# Patient Record
Sex: FEMALE | Race: WHITE | NOT HISPANIC OR LATINO | Employment: FULL TIME | URBAN - METROPOLITAN AREA
[De-identification: names, ages, dates, MRNs, and addresses within clinical notes are randomized per-mention and may not be internally consistent; named-entity substitution may affect disease eponyms.]

---

## 2017-01-03 ENCOUNTER — PRENATAL OFFICE VISIT - HEALTHEAST (OUTPATIENT)
Dept: FAMILY MEDICINE | Facility: CLINIC | Age: 37
End: 2017-01-03

## 2017-01-03 DIAGNOSIS — Z34.90 SUPERVISION OF NORMAL PREGNANCY: ICD-10-CM

## 2017-01-20 ENCOUNTER — PRENATAL OFFICE VISIT - HEALTHEAST (OUTPATIENT)
Dept: FAMILY MEDICINE | Facility: CLINIC | Age: 37
End: 2017-01-20

## 2017-01-20 DIAGNOSIS — Z34.90 SUPERVISION OF NORMAL PREGNANCY: ICD-10-CM

## 2017-02-03 ENCOUNTER — PRENATAL OFFICE VISIT - HEALTHEAST (OUTPATIENT)
Dept: FAMILY MEDICINE | Facility: CLINIC | Age: 37
End: 2017-02-03

## 2017-02-03 DIAGNOSIS — Z34.90 SUPERVISION OF NORMAL PREGNANCY: ICD-10-CM

## 2017-02-10 ENCOUNTER — PRENATAL OFFICE VISIT - HEALTHEAST (OUTPATIENT)
Dept: FAMILY MEDICINE | Facility: CLINIC | Age: 37
End: 2017-02-10

## 2017-02-10 DIAGNOSIS — Z34.90 SUPERVISION OF NORMAL PREGNANCY: ICD-10-CM

## 2017-02-17 ENCOUNTER — PRENATAL OFFICE VISIT - HEALTHEAST (OUTPATIENT)
Dept: FAMILY MEDICINE | Facility: CLINIC | Age: 37
End: 2017-02-17

## 2017-02-17 DIAGNOSIS — Z34.90 SUPERVISION OF NORMAL PREGNANCY: ICD-10-CM

## 2017-02-24 ENCOUNTER — PRENATAL OFFICE VISIT - HEALTHEAST (OUTPATIENT)
Dept: FAMILY MEDICINE | Facility: CLINIC | Age: 37
End: 2017-02-24

## 2017-02-24 DIAGNOSIS — Z34.90 SUPERVISION OF NORMAL PREGNANCY: ICD-10-CM

## 2017-02-24 DIAGNOSIS — Z01.818 PREOP EXAMINATION: ICD-10-CM

## 2017-02-24 DIAGNOSIS — Z98.891 PREVIOUS CESAREAN SECTION: ICD-10-CM

## 2017-03-01 ENCOUNTER — ANESTHESIA - HEALTHEAST (OUTPATIENT)
Dept: OBGYN | Facility: CLINIC | Age: 37
End: 2017-03-01

## 2017-03-02 ENCOUNTER — SURGERY - HEALTHEAST (OUTPATIENT)
Dept: OBGYN | Facility: CLINIC | Age: 37
End: 2017-03-02

## 2017-03-02 ASSESSMENT — MIFFLIN-ST. JEOR: SCORE: 1746.37

## 2017-03-07 ENCOUNTER — COMMUNICATION - HEALTHEAST (OUTPATIENT)
Dept: OBGYN | Facility: CLINIC | Age: 37
End: 2017-03-07

## 2017-03-10 ENCOUNTER — COMMUNICATION - HEALTHEAST (OUTPATIENT)
Dept: SCHEDULING | Facility: CLINIC | Age: 37
End: 2017-03-10

## 2017-03-12 ENCOUNTER — COMMUNICATION - HEALTHEAST (OUTPATIENT)
Dept: FAMILY MEDICINE | Facility: CLINIC | Age: 37
End: 2017-03-12

## 2017-03-18 ENCOUNTER — COMMUNICATION - HEALTHEAST (OUTPATIENT)
Dept: FAMILY MEDICINE | Facility: CLINIC | Age: 37
End: 2017-03-18

## 2017-04-25 ENCOUNTER — OFFICE VISIT - HEALTHEAST (OUTPATIENT)
Dept: FAMILY MEDICINE | Facility: CLINIC | Age: 37
End: 2017-04-25

## 2017-04-25 ASSESSMENT — MIFFLIN-ST. JEOR: SCORE: 1628.43

## 2017-04-27 ENCOUNTER — COMMUNICATION - HEALTHEAST (OUTPATIENT)
Dept: HEALTH INFORMATION MANAGEMENT | Facility: CLINIC | Age: 37
End: 2017-04-27

## 2017-04-28 LAB
HPV INTERPRETATION - HISTORICAL: NORMAL
HPV INTERPRETER - HISTORICAL: NORMAL

## 2017-06-20 ENCOUNTER — COMMUNICATION - HEALTHEAST (OUTPATIENT)
Dept: FAMILY MEDICINE | Facility: CLINIC | Age: 37
End: 2017-06-20

## 2017-09-26 ENCOUNTER — COMMUNICATION - HEALTHEAST (OUTPATIENT)
Dept: FAMILY MEDICINE | Facility: CLINIC | Age: 37
End: 2017-09-26

## 2017-11-22 ENCOUNTER — COMMUNICATION - HEALTHEAST (OUTPATIENT)
Dept: FAMILY MEDICINE | Facility: CLINIC | Age: 37
End: 2017-11-22

## 2018-03-09 ENCOUNTER — COMMUNICATION - HEALTHEAST (OUTPATIENT)
Dept: FAMILY MEDICINE | Facility: CLINIC | Age: 38
End: 2018-03-09

## 2018-03-09 DIAGNOSIS — Z80.0 FAMILY HISTORY OF COLON CANCER IN MOTHER: ICD-10-CM

## 2018-03-14 ENCOUNTER — COMMUNICATION - HEALTHEAST (OUTPATIENT)
Dept: FAMILY MEDICINE | Facility: CLINIC | Age: 38
End: 2018-03-14

## 2018-03-21 ENCOUNTER — OFFICE VISIT - HEALTHEAST (OUTPATIENT)
Dept: FAMILY MEDICINE | Facility: CLINIC | Age: 38
End: 2018-03-21

## 2018-03-21 DIAGNOSIS — R63.5 WEIGHT GAIN: ICD-10-CM

## 2018-03-21 LAB
ALBUMIN SERPL-MCNC: 3.9 G/DL (ref 3.5–5)
ALP SERPL-CCNC: 95 U/L (ref 45–120)
ALT SERPL W P-5'-P-CCNC: 19 U/L (ref 0–45)
ANION GAP SERPL CALCULATED.3IONS-SCNC: 7 MMOL/L (ref 5–18)
AST SERPL W P-5'-P-CCNC: 18 U/L (ref 0–40)
BILIRUB SERPL-MCNC: 0.4 MG/DL (ref 0–1)
BUN SERPL-MCNC: 15 MG/DL (ref 8–22)
CALCIUM SERPL-MCNC: 10.1 MG/DL (ref 8.5–10.5)
CHLORIDE BLD-SCNC: 104 MMOL/L (ref 98–107)
CO2 SERPL-SCNC: 26 MMOL/L (ref 22–31)
CREAT SERPL-MCNC: 0.7 MG/DL (ref 0.6–1.1)
ERYTHROCYTE [DISTWIDTH] IN BLOOD BY AUTOMATED COUNT: 11.2 % (ref 11–14.5)
GFR SERPL CREATININE-BSD FRML MDRD: >60 ML/MIN/1.73M2
GLUCOSE BLD-MCNC: 88 MG/DL (ref 70–125)
HCT VFR BLD AUTO: 41 % (ref 35–47)
HGB BLD-MCNC: 14.4 G/DL (ref 12–16)
MCH RBC QN AUTO: 32.4 PG (ref 27–34)
MCHC RBC AUTO-ENTMCNC: 35.1 G/DL (ref 32–36)
MCV RBC AUTO: 92 FL (ref 80–100)
PLATELET # BLD AUTO: 254 THOU/UL (ref 140–440)
PMV BLD AUTO: 8.5 FL (ref 7–10)
POTASSIUM BLD-SCNC: 4.3 MMOL/L (ref 3.5–5)
PROT SERPL-MCNC: 7.4 G/DL (ref 6–8)
RBC # BLD AUTO: 4.44 MILL/UL (ref 3.8–5.4)
SODIUM SERPL-SCNC: 137 MMOL/L (ref 136–145)
TSH SERPL DL<=0.005 MIU/L-ACNC: 1.47 UIU/ML (ref 0.3–5)
WBC: 7.1 THOU/UL (ref 4–11)

## 2018-03-21 ASSESSMENT — MIFFLIN-ST. JEOR: SCORE: 1651.11

## 2018-03-27 ENCOUNTER — COMMUNICATION - HEALTHEAST (OUTPATIENT)
Dept: FAMILY MEDICINE | Facility: CLINIC | Age: 38
End: 2018-03-27

## 2018-03-29 ENCOUNTER — OFFICE VISIT - HEALTHEAST (OUTPATIENT)
Dept: FAMILY MEDICINE | Facility: CLINIC | Age: 38
End: 2018-03-29

## 2018-03-29 DIAGNOSIS — E66.9 OBESITY (BMI 35.0-39.9 WITHOUT COMORBIDITY): ICD-10-CM

## 2018-03-29 ASSESSMENT — MIFFLIN-ST. JEOR: SCORE: 1646.58

## 2018-06-26 ENCOUNTER — COMMUNICATION - HEALTHEAST (OUTPATIENT)
Dept: FAMILY MEDICINE | Facility: CLINIC | Age: 38
End: 2018-06-26

## 2018-06-26 DIAGNOSIS — E66.9 OBESITY (BMI 35.0-39.9 WITHOUT COMORBIDITY): ICD-10-CM

## 2018-07-02 ENCOUNTER — COMMUNICATION - HEALTHEAST (OUTPATIENT)
Dept: FAMILY MEDICINE | Facility: CLINIC | Age: 38
End: 2018-07-02

## 2018-07-02 DIAGNOSIS — E66.9 OBESITY (BMI 35.0-39.9 WITHOUT COMORBIDITY): ICD-10-CM

## 2018-07-11 ENCOUNTER — OFFICE VISIT - HEALTHEAST (OUTPATIENT)
Dept: FAMILY MEDICINE | Facility: CLINIC | Age: 38
End: 2018-07-11

## 2018-07-11 DIAGNOSIS — E66.9 OBESITY (BMI 35.0-39.9 WITHOUT COMORBIDITY): ICD-10-CM

## 2018-07-11 ASSESSMENT — MIFFLIN-ST. JEOR: SCORE: 1569.47

## 2018-07-12 ENCOUNTER — COMMUNICATION - HEALTHEAST (OUTPATIENT)
Dept: FAMILY MEDICINE | Facility: CLINIC | Age: 38
End: 2018-07-12

## 2018-09-25 ENCOUNTER — COMMUNICATION - HEALTHEAST (OUTPATIENT)
Dept: FAMILY MEDICINE | Facility: CLINIC | Age: 38
End: 2018-09-25

## 2018-10-15 ENCOUNTER — COMMUNICATION - HEALTHEAST (OUTPATIENT)
Dept: FAMILY MEDICINE | Facility: CLINIC | Age: 38
End: 2018-10-15

## 2018-11-08 ENCOUNTER — COMMUNICATION - HEALTHEAST (OUTPATIENT)
Dept: FAMILY MEDICINE | Facility: CLINIC | Age: 38
End: 2018-11-08

## 2018-11-09 ENCOUNTER — AMBULATORY - HEALTHEAST (OUTPATIENT)
Dept: FAMILY MEDICINE | Facility: CLINIC | Age: 38
End: 2018-11-09

## 2018-11-09 DIAGNOSIS — E66.811 OBESITY (BMI 30.0-34.9): ICD-10-CM

## 2018-12-21 ENCOUNTER — RECORDS - HEALTHEAST (OUTPATIENT)
Dept: ADMINISTRATIVE | Facility: OTHER | Age: 38
End: 2018-12-21

## 2019-02-13 ENCOUNTER — OFFICE VISIT - HEALTHEAST (OUTPATIENT)
Dept: FAMILY MEDICINE | Facility: CLINIC | Age: 39
End: 2019-02-13

## 2019-02-13 DIAGNOSIS — E66.811 OBESITY (BMI 30.0-34.9): ICD-10-CM

## 2019-08-01 ENCOUNTER — AMBULATORY - HEALTHEAST (OUTPATIENT)
Dept: FAMILY MEDICINE | Facility: CLINIC | Age: 39
End: 2019-08-01

## 2019-08-01 ENCOUNTER — COMMUNICATION - HEALTHEAST (OUTPATIENT)
Dept: FAMILY MEDICINE | Facility: CLINIC | Age: 39
End: 2019-08-01

## 2019-08-01 DIAGNOSIS — E66.811 OBESITY (BMI 30.0-34.9): ICD-10-CM

## 2019-08-12 ENCOUNTER — OFFICE VISIT - HEALTHEAST (OUTPATIENT)
Dept: FAMILY MEDICINE | Facility: CLINIC | Age: 39
End: 2019-08-12

## 2019-08-12 DIAGNOSIS — E66.01 MORBID OBESITY (H): ICD-10-CM

## 2019-08-12 ASSESSMENT — MIFFLIN-ST. JEOR: SCORE: 1623.9

## 2019-09-05 ENCOUNTER — COMMUNICATION - HEALTHEAST (OUTPATIENT)
Dept: FAMILY MEDICINE | Facility: CLINIC | Age: 39
End: 2019-09-05

## 2019-11-27 ENCOUNTER — COMMUNICATION - HEALTHEAST (OUTPATIENT)
Dept: FAMILY MEDICINE | Facility: CLINIC | Age: 39
End: 2019-11-27

## 2019-11-27 DIAGNOSIS — B00.1 COLD SORE: ICD-10-CM

## 2020-04-06 ENCOUNTER — COMMUNICATION - HEALTHEAST (OUTPATIENT)
Dept: FAMILY MEDICINE | Facility: CLINIC | Age: 40
End: 2020-04-06

## 2020-04-06 DIAGNOSIS — E66.811 OBESITY (BMI 30.0-34.9): ICD-10-CM

## 2020-06-09 ENCOUNTER — COMMUNICATION - HEALTHEAST (OUTPATIENT)
Dept: FAMILY MEDICINE | Facility: CLINIC | Age: 40
End: 2020-06-09

## 2020-06-09 ENCOUNTER — OFFICE VISIT - HEALTHEAST (OUTPATIENT)
Dept: FAMILY MEDICINE | Facility: CLINIC | Age: 40
End: 2020-06-09

## 2020-06-09 ENCOUNTER — COMMUNICATION - HEALTHEAST (OUTPATIENT)
Dept: SCHEDULING | Facility: CLINIC | Age: 40
End: 2020-06-09

## 2020-06-09 DIAGNOSIS — E66.811 OBESITY (BMI 30.0-34.9): ICD-10-CM

## 2020-12-14 ENCOUNTER — COMMUNICATION - HEALTHEAST (OUTPATIENT)
Dept: FAMILY MEDICINE | Facility: CLINIC | Age: 40
End: 2020-12-14

## 2020-12-14 DIAGNOSIS — E66.811 OBESITY (BMI 30.0-34.9): ICD-10-CM

## 2021-01-06 ENCOUNTER — OFFICE VISIT - HEALTHEAST (OUTPATIENT)
Dept: FAMILY MEDICINE | Facility: CLINIC | Age: 41
End: 2021-01-06

## 2021-01-06 DIAGNOSIS — E66.811 OBESITY (BMI 30.0-34.9): ICD-10-CM

## 2021-01-06 RX ORDER — VALACYCLOVIR HYDROCHLORIDE 1 G/1
TABLET, FILM COATED ORAL
Status: SHIPPED | COMMUNITY
Start: 2020-02-07 | End: 2022-02-28

## 2021-05-30 VITALS — BODY MASS INDEX: 39.27 KG/M2 | WEIGHT: 236 LBS

## 2021-05-30 VITALS — BODY MASS INDEX: 38.97 KG/M2 | WEIGHT: 234.2 LBS

## 2021-05-30 VITALS — BODY MASS INDEX: 34.99 KG/M2 | HEIGHT: 65 IN | WEIGHT: 210 LBS

## 2021-05-30 VITALS — WEIGHT: 233.6 LBS | BODY MASS INDEX: 38.87 KG/M2

## 2021-05-30 VITALS — BODY MASS INDEX: 39.51 KG/M2 | WEIGHT: 237.4 LBS

## 2021-05-30 VITALS — WEIGHT: 230.6 LBS | BODY MASS INDEX: 38.37 KG/M2

## 2021-05-30 VITALS — WEIGHT: 235 LBS | BODY MASS INDEX: 39.11 KG/M2

## 2021-05-30 VITALS — BODY MASS INDEX: 39.32 KG/M2 | HEIGHT: 65 IN | WEIGHT: 236 LBS

## 2021-05-31 NOTE — PROGRESS NOTES
PROGRESS NOTE       SUBJECTIVE:  Caryl Johnson is a 39 y.o. female   Chief Complaint   Patient presents with     Medication Refill     med check and refill      Caryl is here today to talk about refills for phentermine.  She is struggled with her weight and has been unsuccessful in losing.  She finds the medication certainly does help decrease her appetite but she has failed at losing weight.  Her body mass index is just under 35.  When I first met her in March 2018 she weighed 215.  On phentermine she was successful in losing weight and weighed 197 July 2018.  Unfortunately she gained almost all of it back.  We talked about how it is hard on her body to losing gain.  We need to have an overall plan to lose and keep her weight off.    Patient Active Problem List   Diagnosis     Common Migraine (Without Aura)     Irregular Length Of Menstrual Periods     Obesity (BMI 30.0-34.9)       Current Outpatient Medications   Medication Sig Dispense Refill     phentermine (ADIPEX-P) 37.5 mg tablet Take 1 tablet (37.5 mg total) by mouth daily before breakfast. 30 tablet 2     valACYclovir (VALTREX) 500 MG tablet Take 1 tablet (500 mg total) by mouth 2 (two) times a day. For 3 days at first sign of out break 6 tablet 2     No current facility-administered medications for this visit.        Social History     Tobacco Use   Smoking Status Never Smoker   Smokeless Tobacco Never Used       REVIEW OF SYSTEMS:  Patient denies fever, chills, dizziness, headache, visual change, ear pain, cough, chest pain, shortness of breath, abdominal pain, extremity pain or swelling, rash,  depression or anxiety.          OBJECTIVE:       Vitals:    08/12/19 0807   BP: 118/78   Pulse: 98   SpO2: 100%     Weight: 209 lb (94.8 kg)    Wt Readings from Last 3 Encounters:   08/12/19 209 lb (94.8 kg)   02/13/19 196 lb 14.4 oz (89.3 kg)   07/11/18 197 lb (89.4 kg)     Body mass index is 34.78 kg/m .        Physical Exam:  GENERAL APPEARANCE: A&A,  NAD, well hydrated, well nourished  SKIN:  Normal skin turgor, no lesions/rashes   NECK: Supple, without lymphadenopathy, no thyroid mass  CV: RRR, no M/G/R   LUNGS: CTAB, normal respiratory effort  PSYCHIATRIC:  Mood appropriate, memory intact        ASSESSMENT/PLAN:     1. Morbid obesity (H)  Patient is very interested in pursuing other options for weight loss.  After discussion of what is available we decided to elect a nonsurgical weight loss clinic.  I think she would be a great candidate because she has demonstrated the ability to lose weight but needs to figure out how to do it in a way that the weight stays off and she is healthy.  No prescriptions were given.  - Ambulatory referral to Bariatric Care: Surgical and Non-Surgical      I spent a total of 20 minutes face to face with the patient.  Over 50% of the time spent counseling and educating the patient about all of the above.      Consuelo Larkin MD

## 2021-06-01 ENCOUNTER — RECORDS - HEALTHEAST (OUTPATIENT)
Dept: FAMILY MEDICINE | Facility: CLINIC | Age: 41
End: 2021-06-01

## 2021-06-01 VITALS — BODY MASS INDEX: 35.65 KG/M2 | HEIGHT: 65 IN | WEIGHT: 214 LBS

## 2021-06-01 VITALS — WEIGHT: 197 LBS | HEIGHT: 65 IN | BODY MASS INDEX: 32.82 KG/M2

## 2021-06-01 VITALS — HEIGHT: 65 IN | WEIGHT: 215 LBS | BODY MASS INDEX: 35.82 KG/M2

## 2021-06-02 VITALS — WEIGHT: 196.9 LBS | BODY MASS INDEX: 32.77 KG/M2

## 2021-06-03 VITALS — HEIGHT: 65 IN | BODY MASS INDEX: 34.82 KG/M2 | WEIGHT: 209 LBS

## 2021-06-04 VITALS — DIASTOLIC BLOOD PRESSURE: 82 MMHG | BODY MASS INDEX: 36.44 KG/M2 | SYSTOLIC BLOOD PRESSURE: 118 MMHG | WEIGHT: 219 LBS

## 2021-06-07 ENCOUNTER — OFFICE VISIT - HEALTHEAST (OUTPATIENT)
Dept: FAMILY MEDICINE | Facility: CLINIC | Age: 41
End: 2021-06-07

## 2021-06-07 DIAGNOSIS — E66.811 OBESITY (BMI 30.0-34.9): ICD-10-CM

## 2021-06-07 RX ORDER — TOPIRAMATE 25 MG/1
25 TABLET, FILM COATED ORAL DAILY
Qty: 30 TABLET | Refills: 3 | Status: SHIPPED | OUTPATIENT
Start: 2021-06-07 | End: 2022-02-08

## 2021-06-07 RX ORDER — PHENTERMINE HYDROCHLORIDE 37.5 MG/1
37.5 TABLET ORAL
Qty: 30 TABLET | Refills: 3 | Status: SHIPPED | OUTPATIENT
Start: 2021-06-07 | End: 2022-02-08

## 2021-06-07 NOTE — TELEPHONE ENCOUNTER
Controlled Substance Refill Request  Medication Name:   Requested Prescriptions     Pending Prescriptions Disp Refills     phentermine (ADIPEX-P) 37.5 mg tablet 30 tablet 2     Sig: Take 1 tablet (37.5 mg total) by mouth daily before breakfast.     Date Last Fill: 8/1/2019  Requested Pharmacy: [facility]  Submit electronically to pharmacy  Controlled Substance Agreement on file:           Last office visit:  8/12/2019  Discussed going to the weight loss clinic. Pt notified to schedule phone visit to discuss refill.     Will send to PCP for review of refill request.

## 2021-06-08 NOTE — PROGRESS NOTES
"Cayrl Johnson is a 40 y.o. female who is being evaluated via a billable video visit.      The patient has been notified of following:     \"This video visit will be conducted via a call between you and your physician/provider. We have found that certain health care needs can be provided without the need for an in-person physical exam.  This service lets us provide the care you need with a video conversation.  If a prescription is necessary we can send it directly to your pharmacy.  If lab work is needed we can place an order for that and you can then stop by our lab to have the test done at a later time.    Video visits are billed at different rates depending on your insurance coverage. Please reach out to your insurance provider with any questions.    If during the course of the call the physician/provider feels a video visit is not appropriate, you will not be charged for this service.\"    Patient has given verbal consent to a Video visit? Yes    Patient would like to receive their AVS by AVS Preference: Karl.    Patient would like the video invitation sent by: Text to cell phone: [phone]    Will anyone else be joining your video visit? No        Video Start Time: 11:58 AM    Additional provider notes:    ASSESSMENT/PLAN:       1. Obesity (BMI 30.0-34.9)  -Has done well historically with phentermine. Will restart this at the dose listed below, will follow up in 3 months for a recheck.  - phentermine (ADIPEX-P) 37.5 mg tablet; Take 1 tablet (37.5 mg total) by mouth daily before breakfast.  Dispense: 30 tablet; Refill: 2        PROGRESS NOTE   6/9/2020    SUBJECTIVE:  Caryl Johnson is a 40 y.o. female  who presents for medication check on her phentermine.     She was started back on phentermine in April. She is struggling with COVID, is working 60-80 hours per week and not having a life. She is at [facility] now, she is with Dr. Jones.     She is up a fair amount with her weight. She is doing ok with " food generally, breakfast granola bar, lunches salad or sandwhich. Does snack during the day as well. At night it depends on what time she gets home and after school activites. She does try to make meals. She does some meal planning. SHe has been working on the weekends as well, so this has been harder. She is in the process of splitting up with her . She is managing ok with this. She has not checked her blood pressure at home.   Chief Complaint   Patient presents with     Medication Management     Follow up on phentermine prescription. Needing a refill of phentermine.          Patient Active Problem List   Diagnosis     Common Migraine (Without Aura)     Irregular Length Of Menstrual Periods     Obesity (BMI 30.0-34.9)       Current Outpatient Medications   Medication Sig Dispense Refill     omeprazole (PRILOSEC) 20 MG capsule Take 20 mg by mouth daily before breakfast.       phentermine (ADIPEX-P) 37.5 mg tablet Take 1 tablet (37.5 mg total) by mouth daily before breakfast. 30 tablet 2     No current facility-administered medications for this visit.        Social History     Tobacco Use   Smoking Status Never Smoker   Smokeless Tobacco Never Used           OBJECTIVE:        No results found for this or any previous visit (from the past 240 hour(s)).    Vitals:    06/09/20 1135   BP: 118/82   Weight: 219 lb (99.3 kg)     Weight: 219 lb (99.3 kg) (Pt reported)       GENERAL: Healthy, alert and no distress  EYES: Eyes grossly normal to inspection. No discharge or erythema, or obvious scleral/conjunctival abnormalities.  RESP: No audible wheeze, cough, or visible cyanosis.  No visible retractions or increased work of breathing.    NEURO: Cranial nerves grossly intact. Mentation and speech appropriate for age.  PSYCH: Mentation appears normal, affect normal/bright, judgement and insight intact, normal speech and appearance well-groomed      Video-Visit Details    Type of service:  Video Visit    Video End Time  (time video stopped): 12:08 PM  Originating Location (pt. Location): Work    Distant Location (provider location):  [facility]/OB     Platform used for Video Visit: Preston Garcia MD

## 2021-06-08 NOTE — PROGRESS NOTES
Caryl Johnson is a 37 y.o. female who is presenting to the clinic today for a routine OB visit. She reports that she is doing well overall. She continues feeling lots of baby movement. She denies any bleeding, but is having occasional contractions, like she has been having.  She is not having anything regular and they are not painful for her.  She reports that her insomnia has been increasing, but she notes that she is able to function normally during the day. She is having trouble getting comfortable during the night and often notices mind racing. She mentions that her grandmother has restless leg syndrome, and she is wondering if she may be experiencing this as well.  At this point she will continue to monitor symptoms.  She is not interested in getting on a medication to help her sleep at this point.  We did discuss that indeed if she does have restless leg syndrome we could discuss this once she is no longer pregnant and no longer breast-feeding but at this point the medications that we could potentially use for that are not appropriate with pregnancy or breast-feeding.  She plans to breast feed and has a breast pump at home. She plans to have a circumcision done if she has a boy. She had her group B strep test done at her last visit and this returned negative. She is going to the hospital after this appointment to visit her friend who recently gave birth.  Cervix closed and thick.  She will complete preregistration form.  All of her questions have been answered today. She will plan to in one week for her next OB visit.  She continues to have a planned  on 3/2/17.    ADDITIONAL HISTORY SUMMARIZED (2): None.  DECISION TO OBTAIN EXTRA INFORMATION (1): None.   RADIOLOGY TESTS (1): None.  LABS (1): None.  MEDICINE TESTS (1): None.  INDEPENDENT REVIEW (2 each): None.     The visit lasted a total of 11 minutes face to face with the patient. Over 50% of the time was spent counseling and educating the  patient about routine OB visit.    I, Ivonne Chowdhury, am scribing for and in the presence of, Dr. Bronson.    I, Dr. Bronson, personally performed the services described in this documentation, as scribed by Ivonne Chowdhury in my presence, and it is both accurate and complete.    Total data points: 0

## 2021-06-08 NOTE — PROGRESS NOTES
Caryl Johnson is a 37 y.o. female who is presenting to the clinic today for a routine ob visit. She reports that she is doing well overall. She continues to feel lots of baby movement and denies any bleeding. She reports 2-3 contractions an hour for a few hours a couple days ago. She has had contractions here and there since, but nothing has been consistent. She has a breast pump already and has done the  Preregistration. She is still planning repeat csection on 3/2/17.  All questions have been answered today. She will return in one week for her next OB appointment.        ADDITIONAL HISTORY SUMMARIZED (2): None.  DECISION TO OBTAIN EXTRA INFORMATION (1): None.   RADIOLOGY TESTS (1): None.  LABS (1): None.  MEDICINE TESTS (1): None.  INDEPENDENT REVIEW (2 each): None.     The visit lasted a total of 5 minutes face to face with the patient. Over 50% of the time was spent counseling and educating the patient about routine OB visit.    I, Ivonne Chowdhury, am scribing for and in the presence of, Dr. Bronson.    I, Dr. Bronson, personally performed the services described in this documentation, as scribed by Ivonne Chowdhury in my presence, and it is both accurate and complete.    Total data points: 0

## 2021-06-08 NOTE — PROGRESS NOTES
Caryl Johnson is a 37 y.o. female who is presenting to the clinic today for a routine prenatal visit. She reports she is doing well overall. She is feeling a lot of baby movement. She denies that she is having any bleeding or contractions. She is planning to have repeat  on 3/2/17. She has already had her Tdap this pregnancy as well as an influenza vaccine.  She also had repeat syphilis testing done in December.  She overall feels like things are doing well.  Her friend had a fetal loss at 36 weeks gestation and therefore she is overly vigilant in terms of making sure that she is monitoring to make sure her baby is continuing to move.  We discussed that this is not uncommon and very reasonable for her to be overly concerned about anything happening to her child at this time.  She delivered at [facility] previously and therefore does not feel like she needs to have a tumor of the hospital nor does she feel like she needs to take any classes.  She was reminded to do preregistration at 36 weeks gestation.  All of her questions were answered today. She will return in 2 weeks for her next OB visit.  We will plan to do group B strep testing as well as hemoglobin at that visit.      ADDITIONAL HISTORY SUMMARIZED (2): None.  DECISION TO OBTAIN EXTRA INFORMATION (1): None.   RADIOLOGY TESTS (1): None.  LABS (1): None.  MEDICINE TESTS (1): None.  INDEPENDENT REVIEW (2 each): None.     The visit lasted a total of 14  minutes face to face with the patient. Over 50% of the time was spent counseling and educating the patient about routine OB visit.    I, Ivonne Chowdhury, am scribing for and in the presence of, Dr. Bronson.    I, Dr. Bronson, personally performed the services described in this documentation, as scribed by Ivonne Chowdhury in my presence, and it is both accurate and complete.    Total data points: 0

## 2021-06-09 NOTE — ANESTHESIA CARE TRANSFER NOTE
Last vitals:   Vitals:    03/02/17 0832   BP: 106/53   Pulse: 80   Resp: 16   Temp: 36.4  C (97.6  F)   SpO2: 100%     Patient's level of consciousness is awake  Spontaneous respirations: yes  Maintains airway independently: yes  Dentition unchanged: yes  Oropharynx: oropharynx clear of all foreign objects    QCDR Measures:  ASA# 20 - Surgical Safety Checklist: ASA20A - Safety Checks Done  PQRS# 430 - Adult PONV Prevention: 4558F-8P - Pt did NOT receive => 2 anti-emetic agents  ASA# 8 - Peds PONV Prevention: NA - Not pediatric patient, not GA or 2 or more risk factors NOT present  PQRS# 424 - Cristela-op Temp Management: 4559F - At least one body temp DOCUMENTED => 35.5C or 95.9F within required timeframe  PQRS# 426 - PACU Transfer Protocol: - Transfer of care checklist used  ASA# 14 - Acute Post-op Pain: ASA14B - Patient did NOT experience pain >= 7 out of 10    I completed my SBAR handoff to the receiving nurse per policy and procedure.

## 2021-06-09 NOTE — ANESTHESIA POSTPROCEDURE EVALUATION
Patient: Caryl Johnson  REPEAT  SECTION   Anesthesia type: spinal    Patient location: Labor and Delivery  Last vitals:   Vitals:    17 0902   BP: 108/66   Pulse: 87   Resp: 16   Temp:    SpO2:      Post vital signs: stable  Level of consciousness: awake, alert and oriented  Post-anesthesia pain: pain controlled  Post-anesthesia nausea and vomiting: no  Pulmonary: unassisted, return to baseline  Cardiovascular: stable and blood pressure at baseline  Hydration: adequate  Anesthetic events: no    QCDR Measures:  ASA# 11 - Cristela-op Cardiac Arrest: ASA11B - Patient did NOT experience unanticipated cardiac arrest  ASA# 12 - Cristela-op Mortality Rate: ASA12B - Patient did NOT die  ASA# 13 - PACU Re-Intubation Rate: NA - No ETT / LMA used for case  ASA# 10 - Composite Anes Safety: ASA10A - No serious adverse event  ASA# 38 - New Corneal Injury: ASA38A - No new exposure keratitis or corneal abrasion in PACU    Additional Notes:

## 2021-06-09 NOTE — ANESTHESIA PREPROCEDURE EVALUATION
Anesthesia Evaluation      Patient summary reviewed   No history of anesthetic complications     Airway   Mallampati: I   Pulmonary - normal exam                          Cardiovascular - negative ROS  Rhythm: regular  Rate: normal,         Neuro/Psych - negative ROS     Endo/Other    (+) pregnant     GI/Hepatic/Renal            Dental                         Anesthesia Plan  Planned anesthetic: spinal    ASA 2     Anesthetic plan and risks discussed with: patient    Post-op plan: routine recovery

## 2021-06-09 NOTE — ANESTHESIA PROCEDURE NOTES
Spinal Block    Patient location during procedure: OR  Start time: 3/2/2017 7:47 AM  End time: 3/2/2017 7:48 AM  Reason for block: primary anesthetic    Staffing:  Performing  Anesthesiologist: KARLA VENEGAS    Preanesthetic Checklist  Completed: patient identified, risks, benefits, and alternatives discussed, timeout performed, consent obtained, airway assessed, oxygen available, suction available, emergency drugs available and hand hygiene performed  Spinal Block  Patient position: sitting  Prep: ChloraPrep  Patient monitoring: heart rate, cardiac monitor, continuous pulse ox and blood pressure  Approach: midline  Location: L3-4  Injection technique: single-shot  Needle type: pencil-tip   Needle gauge: 24 G      Additional Notes:  Tolerated well

## 2021-06-09 NOTE — PROGRESS NOTES
Assessment/Plan:     Assessment: Caryl Johnson is a 37 y.o. female who presents for pre-op history and physical. She is scheduled to undergo a repeat  on 3/2/17.     Plan:    She appears to be medically optimized for the planned procedure.   . Labs were done as indicated below.  Recommendations were reviewed with the patient.     The patient is approved for surgery and is considered to be low anesthetic risk.   Follow up as needed.    Please page me at [phone] if you have any questions or concerns regarding the care of this patient.     Subjective:     Scheduled Procedure: Repeat   Surgery Date:  3/2/17  Surgery Location:  [facility]   Surgeon:  Dr. Pleitez    Current Outpatient Prescriptions   Medication Sig Dispense Refill     prenatal vitamin iron-folic acid 27mg-0.8mg (PRENATAL S) 27 mg iron- 800 mcg Tab tablet Take 1 tablet by mouth daily.       No current facility-administered medications for this visit.        No Known Allergies    Immunization History   Administered Date(s) Administered     Influenza, inj, historic 2014     Influenza,seasonal quad, PF, 36+MOS 10/14/2016     Tdap 2015, 2016       Patient Active Problem List   Diagnosis     Common Migraine (Without Aura)     Irregular Length Of Menstrual Periods     Supervision of normal pregnancy       Past Medical History:   Diagnosis Date     Elevated blood pressure complicating pregnancy, antepartum 2015     Herpes genitalia     on valtrex when needed for outbreaks     Infertility, anovulation     used clomid to concieve first 3 pregnancies, first 2 were miscarriages     Leg swelling in pregnancy 2015     Proteinuria complicating pregnancy 2015     Single delivery by  section 2015     Unspecified spontaneous  without mention of complication     x2, 2014, 2014       Social History     Social History     Marital status:      Spouse name: Regino     Number of children: 1      Years of education: N/A     Occupational History     homemaker      Social History Main Topics     Smoking status: Never Smoker     Smokeless tobacco: Never Used     Alcohol use No     Drug use: No     Sexual activity: Yes     Partners: Male      Comment: pregnancy     Other Topics Concern     Not on file     Social History Narrative       Past Surgical History:   Procedure Laterality Date      SECTION  2015    failure to progress beyond 8 cm, elevated blood pressures after delivery     INGUINAL HERNIA REPAIR Left     3rd grade, used mesh       Family History   Problem Relation Age of Onset     Colon cancer Mother      Colorectal cancer,       Hyperlipidemia Father      Hypertension Father      Kidney disease Father      Breast cancer Paternal Grandmother      Diagnosed age 60s     Uterine cancer Paternal Grandmother      Diagnosed age 70s     Heart disease Paternal Grandfather      MI, CABG ×4     Hypertension Paternal Grandfather      Kidney cancer Maternal Uncle      Prostate cancer Maternal Uncle      Liver cancer Paternal Uncle          HPI: Caryl Johnson is a 37 y.o. female who is presenting to the clinic today for a preoperative exam. She is scheduled to undergo a  on 3/2/17 at [facility] with Dr. Pleitez. She had a  in 2015 after failure to progress during labor. There were no complications. She reports that she is doing well overall. She continues to feel lots of baby movement and denies any bleeding. She continues to have contractions intermittently, unchanged from last week.     History of Present Illness  Recent Health  Fever: no  Chills: no  Fatigue: no  Chest Pain: no  Cough: no  Dyspnea: no  Urinary Frequency: no  Nausea: no  Vomiting: no  Diarrhea: no  Abdominal Pain: no  Easy Bruising: no  Lower Extremity Swelling: no  Poor Exercise Tolerance: no    Pertinent History  Prior Anesthesia: yes  Previous Anesthesia Reaction:  no  Diabetes:  no  Cardiovascular Disease: no  Pulmonary Disease: no  Renal Disease: no  GI Disease: no  Sleep Apnea: no  Thromboembolic Problems: no  Clotting Disorder: no  Bleeding Disorder: no  Transfusion Reaction: no  Impaired Immunity: no  Steroid use in the last 6 months: no  Frequent Aspirin use: no    No family history of anesthesia reaction, seizure, aneurysm, sudden death, clotting disorder or bleeding disorder.     After surgery, the patient plans to recover at home with family.    Review of Systems:  Denies fever, chills, visual changes, fatigue, myalgias, nasal congestion, rhinorrhea, ear pain or discharge, sore throat, swollen glands, breast mass, nipple discharge, breast changes, abdominal pain, nausea, vomiting, diarrhea, constipation, cough, shortness of breath, chest pain, weight change, change in bowel habits, melena, rectal bleeding, dysuria, frequency, urgency, hematuria, polyuria, polydipsia, polyphagia, joint pain or swelling or erythema, edema, rash, weakness, paresthesias, vaginal discharge or bleeding or mood changes.  Remainder of review of systems was negative.    Positives: Some intermittent contractions. Episodes of light-headedness yesterday, better today.       Objective:     Visit Vitals     /66     Wt (!) 236 lb (107 kg)     LMP 05/31/2016     BMI 39.27 kg/m2     Weight: 236 lb (107 kg)      Physical Exam:  General Appearance: Alert, cooperative, no distress, appears stated age  Head: Normocephalic, without obvious abnormality, atraumatic  Eyes: PERRL, conjunctiva/corneas clear, EOM's intact  Ears: Normal TM's and external ear canals, both ears  Nose: Nares normal, septum midline,mucosa normal, no drainage  Throat: Lips, mucosa, and tongue normal; teeth and gums normal  Neck: Supple, symmetrical, trachea midline, no adenopathy;  thyroid: not enlarged, symmetric, no tenderness/mass/nodules  Back: Symmetric, no curvature, ROM normal, no CVA tenderness  Lungs: Clear to auscultation  bilaterally, respirations unlabored  Heart: Regular rate and rhythm, S1 and S2 normal, no murmur, rub, or gallop   Abdomen: Soft, non-tender, bowel sounds active all four quadrants,  no masses, no organomegaly. Gravid uterus, appropriate for 39 weeks gestation.   Extremities: Extremities normal, atraumatic, no cyanosis or edema  Skin: Skin color, texture, turgor normal, no rashes or lesions  Lymph nodes: Cervical, supraclavicular nodes normal  Neurologic: Normal         Maryanne Bronson M.D.  [id number]  [place] [address]  Ph: [phone] Fax: [phone]  Pager [phone]      ADDITIONAL HISTORY SUMMARIZED (2): None.  DECISION TO OBTAIN EXTRA INFORMATION (1): None.   RADIOLOGY TESTS (1): None.  LABS (1): None.  MEDICINE TESTS (1): None.  INDEPENDENT REVIEW (2 each): None.     The visit lasted a total of 16 minutes face to face with the patient. Over 50% of the time was spent counseling and educating the patient about routine OB visit and preoperative exam.    I, Ivonne Chowdhury, am scribing for and in the presence of, Dr. Bronson.    I, Dr. Bronson, personally performed the services described in this documentation, as scribed by Ivonne Chowdhury in my presence, and it is both accurate and complete.    Total data points: 0

## 2021-06-09 NOTE — PROGRESS NOTES
She reports that she is doing well overall. She continues to feel lots of baby movement and denies any bleeding. She continues to have contractions intermittently, unchanged from last week. Yesterday when standing after sitting, she experienced some light-headedness on three separate occasions. She has not had an episode since, and is trying to drink more water. She denies swelling in her legs. She denies stomach pains. She has had all of her lab work and vaccinations.  She will continue to monitor the lightheadedness but I suspect it may be because she is not drinking enough water we discussed this at length today.  Preop exam was performed today.  Please see separate dictation.  All of her questions have been answered today.  She will present to labor and delivery next Thursday 3/217 for scheduled .  If she has additional questions or concerns prior to that will let me know.

## 2021-06-13 NOTE — TELEPHONE ENCOUNTER
Last visit 6/9/20  Last rx 6/9/20 #30 RF 2.   Upcoming appt 1/6/21    Pt requesting refill of Phentermine.

## 2021-06-14 NOTE — PROGRESS NOTES
Caryl Johnson is a 41 y.o. female who is being evaluated via a billable video visit.      How would you like to obtain your AVS? MyChart.  If dropped from the video visit, the video invitation should be resent by: Text to cell phone: [phone]   Will anyone else be joining your video visit? No      Video Start Time: 12:07 PM  Assessment & Plan     Obesity (BMI 30.0-34.9)  -stable weight at this time. As her loss has slowed will trial addition of topamax on with her phentermine. If doing well will f/u in 3 months. If not doing well will f/u with me sooner via mychart and we can adjust dosage of topamax (with decrease in phentermine) or change of medication all together.   - topiramate (TOPAMAX) 25 MG tablet  Dispense: 30 tablet; Refill: 2  - phentermine (ADIPEX-P) 37.5 mg tablet  Dispense: 30 tablet; Refill: 2               Return in about 3 months (around 4/6/2021) for recheck.    Caryl Garcia MD  [facility]     Caryl Johnson is 41 y.o. and presents to clinic today for the following health issues   HPI     She is following up today for a medication check. She has been on phentermine and is due for a medication check. She is packing up her house right now, they are closing on her house on Friday. This is new from last time.     She is wondering about if she has hit a plateau with the phentermine.     She does have some stress/anxiety with thechanges. She has never been on medications for this. She has been doing breathing exercises, clearing her mind. This is working well for her right now.       Review of Systems      Objective    Vitals - Patient Reported  Systolic (Patient Reported): 124  Diastolic (Patient Reported): 78  Weight (Patient Reported): 205 lb (93 kg)    Physical Exam  GENERAL: Healthy, alert and no distress  EYES: Eyes grossly normal to inspection. No discharge or erythema, or obvious scleral/conjunctival abnormalities.  RESP: No  audible wheeze, cough, or visible cyanosis.  No visible retractions or increased work of breathing.    NEURO: Cranial nerves grossly intact. Mentation and speech appropriate for age.  PSYCH: Mentation appears normal, affect normal/bright, judgement and insight intact, normal speech and appearance well-groomed              Video-Visit Details    Type of service:  Video Visit    Video End Time (time video stopped): 12:21 PM  Originating Location (pt. Location): Home    Distant Location (provider location):  [facility]     Platform used for Video Visit: [employer]

## 2021-06-16 PROBLEM — E66.811 OBESITY (BMI 30.0-34.9): Status: ACTIVE | Noted: 2018-11-09

## 2021-06-16 NOTE — PROGRESS NOTES
PROGRESS NOTE   3/21/2018    SUBJECTIVE:  Caryl Johnson is a 38 y.o. female  who presents for   Chief Complaint   Patient presents with     Weight Check     talk anout weight loss     Patient comes in today because she is having difficulty losing weight.  She is always been able to lose weight by watching what she eats as well as increasing her exercise and she has been exercising since the first part of December and has not been able to lose weight.  She notes that she exercises 3-5 times a week for about 30-45 minutes each time.  She has changed her diet she is not increased her alcohol intake and she is drinking a lot of water a day and still has not seen any weight loss.  She notes that she is fluctuating maybe 2 or 3 pounds up and down but otherwise has not really seen a lot of weight loss.  She has been doing a beach body workout on a 3-5 day week basis.  This particular workup has been successful for her in the past.  She is chewing gum to try to curb her sweet tooth and really feels like she has done a lot to change her diet and allowed to increase her exercise and just has not seen any results as a result of it.  She also does not really feel like she is toning up and getting more muscle as a result of increasing her exercise significantly.  She is able to exercise fine.  She gets the soreness in her muscles after she exercises like she should but again just has not seen any results.  She does note that she is not sleeping all that well.  She only sleeps about 5 hours a night because she has a hard time falling asleep as well as staying asleep.  She does not note that she has had any more stress than she normally does.  She recently moved back to work and she has 2 kids at home and so that has been a little bit stressful however she feels like she is dealing with that stress quite well and is kind of becoming a new norm so that is not really an issue for her.    Patient Active Problem List   Diagnosis  "    Common Migraine (Without Aura)     Irregular Length Of Menstrual Periods       No current outpatient prescriptions on file.     No current facility-administered medications for this visit.        No Known Allergies    Past Medical History:   Diagnosis Date     Elevated blood pressure complicating pregnancy, antepartum 2015     Family history of colon cancer in mother     needs colonoscopy every 5 yrs per [facility], had 2013     Herpes genitalia     on valtrex when needed for outbreaks     Infertility, anovulation     used clomid to concieve first 3 pregnancies, first 2 were miscarriages     Proteinuria complicating pregnancy 2015    also leg swelling     Single delivery by  section 2015     Unspecified spontaneous  without mention of complication     x2, 2014, 2014       Past Surgical History:   Procedure Laterality Date      SECTION  2015    failure to progress beyond 8 cm, elevated blood pressures after delivery      SECTION N/A 3/2/2017    Procedure: REPEAT  SECTION ;  Surgeon: Hank Pleitez MD;  Location: [facility];  Service:       SECTION, LOW TRANSVERSE       INGUINAL HERNIA REPAIR Left     3rd grade, used mesh     REPEAT  SECTION  2017    REPEAT / LESS / WW        History   Smoking Status     Never Smoker   Smokeless Tobacco     Never Used       OBJECTIVE:     /74 (Patient Site: Right Arm, Patient Position: Sitting, Cuff Size: Adult Regular)  Pulse 62 Comment: regular  Temp 98.2  F (36.8  C) (Oral)   Resp 14 Comment: regular  Ht 5' 5\" (1.651 m)  Wt 215 lb (97.5 kg)  BMI 35.78 kg/m2    Physical Exam:  GENERAL APPEARANCE: A&A, NAD, well hydrated, well nourished  SKIN:  Normal skin turgor, no lesions/rashes   HEENT: moist mucous membranes, no rhinorrhea, PERRLA, TM's clear bilaterally, Throat without significant erythema or exudate.  NECK: Supple, full ROM, no significant lymphadenopathy or thyromegaly  CV: " RRR, no M/G/R   LUNGS: CTAB  ABDOMEN: S&NT, no masses or organomegaly, positive bowel sounds   EXTREMITY: no swelling noted.  Full range of motion of all 4 extremities.   NEURO: no gross deficits   PSYCHIATRIC;  Mood appropriate, memory intact    LABS:     Recent Results (from the past 240 hour(s))   HM2(CBC w/o Differential)   Result Value Ref Range    WBC 7.1 4.0 - 11.0 thou/uL    RBC 4.44 3.80 - 5.40 mill/uL    Hemoglobin 14.4 12.0 - 16.0 g/dL    Hematocrit 41.0 35.0 - 47.0 %    MCV 92 80 - 100 fL    MCH 32.4 27.0 - 34.0 pg    MCHC 35.1 32.0 - 36.0 g/dL    RDW 11.2 11.0 - 14.5 %    Platelets 254 140 - 440 thou/uL    MPV 8.5 7.0 - 10.0 fL   Comprehensive Metabolic Panel   Result Value Ref Range    Sodium 137 136 - 145 mmol/L    Potassium 4.3 3.5 - 5.0 mmol/L    Chloride 104 98 - 107 mmol/L    CO2 26 22 - 31 mmol/L    Anion Gap, Calculation 7 5 - 18 mmol/L    Glucose 88 70 - 125 mg/dL    BUN 15 8 - 22 mg/dL    Creatinine 0.70 0.60 - 1.10 mg/dL    GFR MDRD Af Amer >60 >60 mL/min/1.73m2    GFR MDRD Non Af Amer >60 >60 mL/min/1.73m2    Bilirubin, Total 0.4 0.0 - 1.0 mg/dL    Calcium 10.1 8.5 - 10.5 mg/dL    Protein, Total 7.4 6.0 - 8.0 g/dL    Albumin 3.9 3.5 - 5.0 g/dL    Alkaline Phosphatase 95 45 - 120 U/L    AST 18 0 - 40 U/L    ALT 19 0 - 45 U/L   Thyroid Cascade   Result Value Ref Range    TSH 1.47 0.30 - 5.00 uIU/mL       ASSESSMENT/PLAN:     Weight gain [R63.5]      1. Weight gain  - HM2(CBC w/o Differential)  - Comprehensive Metabolic Panel  - Thyroid [place]    Patient overall seems to be doing fine.  I do not see any evidence for abnormalities based on exam today.  We did review the flow chart and essentially her weight is stable.  She has not really lost a whole lot of weight and also has not really gained a lot of weight in the year and a half since I seen her other than when she was pregnant.  Today would like to go ahead and draw CBC as well as electrolytes as well as a thyroid level to make sure that  none of those are contributing to her lack of weight loss.  We talked about may be setting her up to see someone within the [facility] wellness program to help with dietary education and see if maybe she is eating something that she does not realize she is eating as providing a lot of calories for her.  She notes that she does not think that she drinks a whole lot of calories and as noted above she has been watching her diet very carefully but sometimes things are noted in her diet that she does not realize she is in taking as far as empty calories.  We also briefly discussed that [facility] weight loss program and she will think about that as well.  I will contact her with results of the lab work when it returns.  If she is interested in going to [facility] or [place] [facility] loss clinic she certainly will let me know.  We talked about the fact that is variable as to how much insurance coverage there is especially for the [facility] so I would definitely want her to check with her insurance before she gets involved with the [facility].  Again all of her questions were answered today.  We will see her back on an as-needed basis.  Maryanne Bronson MD

## 2021-06-17 NOTE — PROGRESS NOTES
PROGRESS NOTE       SUBJECTIVE:  Caryl Johnson is a 38 y.o. female   Chief Complaint   Patient presents with     Weight Loss     pt wanting to dicuss possible weight loss medication.    This is a patient who sees Dr. Bronson regularly.  She has 2 children ages 3 and 1.  She has never had difficulty with her weight but has been unable to lose her pregnancy weight after her last delivery.  She describes working out 3-5 days per week.  She is not on a specific diet but has been eating more consciously and not as much.  For instance, she has been eating salads instead of a sandwich trying to cut down on bread.  She makes sure that she eats breakfast and does not skip.  She has been drinking protein shakes instead of a meal.  Her weight goal is 180, which is a weight that she feels good at.    Patient is an RN.  Patient Active Problem List   Diagnosis     Common Migraine (Without Aura)     Irregular Length Of Menstrual Periods       Current Outpatient Prescriptions   Medication Sig Dispense Refill     phentermine (ADIPEX-P) 37.5 mg tablet Take 1 tablet (37.5 mg total) by mouth daily before breakfast. 30 tablet 2     No current facility-administered medications for this visit.        History   Smoking Status     Never Smoker   Smokeless Tobacco     Never Used       REVIEW OF SYSTEMS:  Patient denies fever, chills, dizziness, headache, visual change, cough, chest pain, shortness of breath, abdominal pain, extremity pain or swelling.          OBJECTIVE:       Vitals:    03/29/18 0931   BP: 115/70   Pulse: 80     Weight: 214 lb (97.1 kg)  Wt Readings from Last 3 Encounters:   03/29/18 214 lb (97.1 kg)   03/21/18 215 lb (97.5 kg)   04/25/17 210 lb (95.3 kg)     Body mass index is 35.61 kg/(m^2).        Physical Exam:  GENERAL APPEARANCE: A&A, NAD, well hydrated, well nourished  NECK: Supple, without lymphadenopathy, no thyroid mass  CV: RRR, no M/G/R   LUNGS: CTAB, normal respiratory effort  EXTREMITY: Extremities  normal, atraumatic, no swelling  NEURO: no gross deficits   PSYCHIATRIC:  Mood appropriate, memory intact        ASSESSMENT/PLAN:     1. Obesity (BMI 35.0-39.9 without comorbidity)  Patient's body mass index is in the obese range.  Initial goal is to get her weight below 200.  I have advised a very slow but determined weight loss.  It is not unreasonable to lose 10 pounds per year, for instance.  Initially she could lose the 14 pounds more quickly and then very purposefully continue.  We talked about medications to help curb the appetite.  Phentermine is a controlled substance but has been quite successful in helping people lose weight.  The problem with it is that you have to change your eating habits permanently or the weight will come back on when he stopped the medication.  It is potentially habit-forming but I have not seen that to be a problem for most patients.  Usually we keep you on the medication until you reach your weight goal, then come up with a maintenance plan so that you never gain weight again.  Oftentimes this medication is not covered by insurance.  I recommend that she work with it and see me every 3 months to monitor her progress and assist her with her weight loss efforts.    Today we talked about cutting down on portion sizes and ways to decrease carbohydrates without feeling like cutting food completely out.  Be careful of processed foods and eat more real fruit and vegetables.  - phentermine (ADIPEX-P) 37.5 mg tablet; Take 1 tablet (37.5 mg total) by mouth daily before breakfast.  Dispense: 30 tablet; Refill: 2    There are no Patient Instructions on file for this visit.  There are no discontinued medications.  Return in about 3 months (around 6/29/2018) for weight check.    The visit lasted a total of 30 minutes face to face with the patient.  Over 50% of the time spent counseling and educating the patient about all of the above.      Consuelo Larkin MD

## 2021-06-19 NOTE — LETTER
Letter by Consuelo Larkin MD at      Author: Consuelo Larkin MD Service: -- Author Type: --    Filed:  Encounter Date: 8/12/2019 Status: (Other)         [facility] FAMILY MEDICINE/OB  08/12/19    Patient: Caryl Johnson  YOB: 1980  Medical Record Number: [id number]  CSN: [id number]                                                                              Non-opioid Controlled Substance Agreement    I understand that my care provider has prescribed a controlled substance to help manage my condition(s). I am taking this medicine to help me function or work. I know this is strong medicine, and that it can cause serious side effects. Controlled substances can be sedating, addicting and may cause a dependency on the drug. They can affect my ability to drive or think, and cause depression. They need to be taken exactly as prescribed. Combining controlled substances with certain medicines or chemicals (such as cocaine, sedatives and tranquilizers, sleeping pills, meth) can be dangerous or even fatal. Also, if I stop controlled substances suddenly, I may have severe withdrawal symptoms.  If not helpful, I may be asked to stop them.    The risks, benefits, and side effects of these medicine(s) were explained to me. I agree that:    1. I will take part in other treatments as advised by my care team. This may be psychiatry or counseling, physical therapy, behavioral therapy, group treatment or a referral to a pain clinic. I will reduce or stop my medicine when my care team tells me to do so.  2. I will take my medicines as prescribed. I will not change the dose or schedule unless my care team tells me to. There will be no refills if I run out early.  I may be contactedwithout warning and asked to complete a urine drug test or pill count at any time.   3. I will keep all my appointments, and understand this is part of the monitoring of controlled substances. My care team may require an office  visit for EVERY controlled substance refill. If I miss appointments or dont follow instructions, my care team may stop my medicine.  4. I will not ask other providers to prescribe controlled substances, and I will not accept controlled substances from other people. If I need another prescribed controlled substance for a new reason, I will tell my care team within 1 business day.  5. I will use one pharmacy to fill all of my controlled substance prescriptions, and it is up to me to make sure that I do not run out of my medicines on weekends or holidays. If my care team is willing to refill my controlled substance prescription without a visit, I must request refills only during office hours, refills may take up to 3 days to process, and it may take up to 5 to 7 days for my medicine to be mailed and ready at my pharmacy. Prescriptions will not be mailed anywhere except my pharmacy.    6. I am responsible for my prescriptions. If the medicine/prescription is lost or stolen, it will not be replaced. I also agree not to share controlled substance medicines with anyone.          [facility] FAMILY MEDICINE/OB  08/12/19  Patient:  Caryl Johnson  YOB: 1980  Medical Record Number: [id number]  CSN: [id number]    7. I agree to not use ANY illegal or recreational drugs. This includes marijuana, cocaine, bath salts or other drugs. I agree not to use alcohol unless my care team says I may. I agree to give urine samples whenever asked. If I dont give a urine sample, the care team may stop my medicine.    8. If I enroll in the [place] Medical Marijuana program, I will tell my care team. I will also sign an agreement to share my medical records with my care team.    9. I will bring in my list of medicines (or my medicine bottles) each time I come to the clinic.   10. I will tell my care team right away if I become pregnant or have a new medical problem treated outside of my regular clinic.  11. I understand  that this medicine can affect my thinking and judgment. It may be unsafe for me to drive, use machinery and do dangerous tasks. I will not do any of these things until I know how the medicine affects me. If my dose changes, I will wait to see how it affects me. I will contact my care team if I have concerns about medicine side effects.    I understand that if I do not follow any of the conditions above, my prescriptions or treatment may be stopped.      I agree that my provider, clinic care team, and pharmacy may work with any city, state or federal law enforcement agency that investigates the misuse, sale, or other diversion of my controlled medicine. I will allow my provider to discuss my care with or share a copy of this agreement with any other treating provider, pharmacy or emergency room where I receive care. I agree to give up (waive) any right of privacy or confidentiality with respect to these consents.   I have read this agreement and have asked questions about anything I did not understand.    ___________________________________________________________________________  Patient signature - Date/Time  -Caryl Johnson                                      ___________________________________________________________________________  Witness signature                                                                    ___________________________________________________________________________  Provider signature- Consuelo Larkin MD

## 2021-06-19 NOTE — PROGRESS NOTES
PROGRESS NOTE       SUBJECTIVE:  Caryl Johnson is a 38 y.o. female   Chief Complaint   Patient presents with     Medication Refill     med check and refills    This patient presents in follow-up for weight loss.  She has been taking phentermine 1 tablet daily without side effect.  She is very happy to report that she has lost 17 pounds.  Her goal is to get down to 180.  She has not no side effects from the phentermine and is very pleased with how it suppresses her appetite.  She is focusing on eating healthy foods and trying to get more exercise going.      Patient Active Problem List   Diagnosis     Common Migraine (Without Aura)     Irregular Length Of Menstrual Periods       Current Outpatient Prescriptions   Medication Sig Dispense Refill     phentermine (ADIPEX-P) 37.5 mg tablet Take 1 tablet (37.5 mg total) by mouth daily before breakfast. 30 tablet 0     No current facility-administered medications for this visit.        History   Smoking Status     Never Smoker   Smokeless Tobacco     Never Used       REVIEW OF SYSTEMS:  Patient denies fever, chills, dizziness, headache, visual change, cough, chest pain, shortness of breath, abdominal pain, extremity pain or swelling.    OBJECTIVE:       Vitals:    07/11/18 1550   BP: 118/68   Pulse: 88     Weight: 197 lb (89.4 kg)  Wt Readings from Last 3 Encounters:   07/11/18 197 lb (89.4 kg)   03/29/18 214 lb (97.1 kg)   03/21/18 215 lb (97.5 kg)     Body mass index is 32.78 kg/(m^2).        Physical Exam:  GENERAL APPEARANCE: A&A, NAD, well hydrated, well nourished  Patient's body mass index was 35.61 and is now 32.78.  PSYCHIATRIC:  Mood appropriate, memory intact        ASSESSMENT/PLAN:     1. Obesity (BMI 35.0-39.9 without comorbidity)  Very nice weight loss.  I explained to her that as she approaches a more normal weight her weight loss will slow down.  And in fact we want that to happen.  She should focus on continued healthy eating with smaller portions,  limiting carbs.  Making sure she gets plenty of protein.  Exercise helps stimulate metabolism.  We will use the phentermine until she gets close to a normal weight and then we will talk about a maintenance plan.  This weight loss is to be approached as a change in lifestyle and she should be able to keep the weight off successfully after she stops the phentermine.  (This is our goal).    Because this is a scheduled medication a controlled substance treatment agreement as necessary.  I reviewed this with her and the contract is signed.  - phentermine 37.5 MG capsule; Take 1 capsule (37.5 mg total) by mouth every morning.  Dispense: 30 capsule; Refill: 2    Patient will return in 3-6 months.  If she is doing well, I am okay if she calls for refill as long as I see her within the six-month timeframe.  Return for 3-6 months..    The visit lasted a total of 25 minutes face to face with the patient.  Over 50% of the time spent counseling and educating the patient about all of the above.      Consuelo Larkin MD

## 2021-06-19 NOTE — LETTER
Letter by Consuelo Larkin MD at      Author: Consuelo Larkin MD Service: -- Author Type: --    Filed:  Encounter Date: 8/12/2019 Status: (Other)         [facility] FAMILY MEDICINE/OB  08/12/19    Patient: Caryl Johnson  YOB: 1980  Medical Record Number: [id number]  CSN: [id number]                                                                              Non-opioid Controlled Substance Agreement    I understand that my care provider has prescribed a controlled substance to help manage my condition(s). I am taking this medicine to help me function or work. I know this is strong medicine, and that it can cause serious side effects. Controlled substances can be sedating, addicting and may cause a dependency on the drug. They can affect my ability to drive or think, and cause depression. They need to be taken exactly as prescribed. Combining controlled substances with certain medicines or chemicals (such as cocaine, sedatives and tranquilizers, sleeping pills, meth) can be dangerous or even fatal. Also, if I stop controlled substances suddenly, I may have severe withdrawal symptoms.  If not helpful, I may be asked to stop them.    The risks, benefits, and side effects of these medicine(s) were explained to me. I agree that:    1. I will take part in other treatments as advised by my care team. This may be psychiatry or counseling, physical therapy, behavioral therapy, group treatment or a referral to a pain clinic. I will reduce or stop my medicine when my care team tells me to do so.  2. I will take my medicines as prescribed. I will not change the dose or schedule unless my care team tells me to. There will be no refills if I run out early.  I may be contactedwithout warning and asked to complete a urine drug test or pill count at any time.   3. I will keep all my appointments, and understand this is part of the monitoring of controlled substances. My care team may require an office  visit for EVERY controlled substance refill. If I miss appointments or dont follow instructions, my care team may stop my medicine.  4. I will not ask other providers to prescribe controlled substances, and I will not accept controlled substances from other people. If I need another prescribed controlled substance for a new reason, I will tell my care team within 1 business day.  5. I will use one pharmacy to fill all of my controlled substance prescriptions, and it is up to me to make sure that I do not run out of my medicines on weekends or holidays. If my care team is willing to refill my controlled substance prescription without a visit, I must request refills only during office hours, refills may take up to 3 days to process, and it may take up to 5 to 7 days for my medicine to be mailed and ready at my pharmacy. Prescriptions will not be mailed anywhere except my pharmacy.    6. I am responsible for my prescriptions. If the medicine/prescription is lost or stolen, it will not be replaced. I also agree not to share controlled substance medicines with anyone.          [facility] FAMILY MEDICINE/OB  08/12/19  Patient:  Caryl Johnson  YOB: 1980  Medical Record Number: [id number]  CSN: [id number]    7. I agree to not use ANY illegal or recreational drugs. This includes marijuana, cocaine, bath salts or other drugs. I agree not to use alcohol unless my care team says I may. I agree to give urine samples whenever asked. If I dont give a urine sample, the care team may stop my medicine.    8. If I enroll in the [place] Medical Marijuana program, I will tell my care team. I will also sign an agreement to share my medical records with my care team.    9. I will bring in my list of medicines (or my medicine bottles) each time I come to the clinic.   10. I will tell my care team right away if I become pregnant or have a new medical problem treated outside of my regular clinic.  11. I understand  that this medicine can affect my thinking and judgment. It may be unsafe for me to drive, use machinery and do dangerous tasks. I will not do any of these things until I know how the medicine affects me. If my dose changes, I will wait to see how it affects me. I will contact my care team if I have concerns about medicine side effects.    I understand that if I do not follow any of the conditions above, my prescriptions or treatment may be stopped.      I agree that my provider, clinic care team, and pharmacy may work with any city, state or federal law enforcement agency that investigates the misuse, sale, or other diversion of my controlled medicine. I will allow my provider to discuss my care with or share a copy of this agreement with any other treating provider, pharmacy or emergency room where I receive care. I agree to give up (waive) any right of privacy or confidentiality with respect to these consents.   I have read this agreement and have asked questions about anything I did not understand.    ___________________________________________________________________________  Patient signature - Date/Time  -Caryl Johnson                                      ___________________________________________________________________________  Witness signature                                                                    ___________________________________________________________________________  Provider signature- Consuelo Larkin MD

## 2021-06-24 NOTE — PROGRESS NOTES
ASSESSMENT/PLAN:       1. Obesity (BMI 30.0-34.9)  -The patient generally is doing well on the phentermine without side effects.  She does not have the same effect with the capsules however so I did change her prescription to dispense as written.  She has met a bit of a plateau when she took a quick holiday from the medication and has restarted in the last few weeks.  She does think that this is increased her results, but given this I will have her follow-up in approximately 3-4 months for recheck on her medication.  She will call sooner if she is having issues or further concerns.  Encouraged her to continue to work on exercise as much as able as well.  - phentermine (ADIPEX-P) 37.5 mg tablet; Take 1 tablet (37.5 mg total) by mouth daily before breakfast.  Dispense: 30 tablet; Refill: 2      Return in about 3 months (around 5/13/2019) for recheck.      Caryl Garcia MD      PROGRESS NOTE   2/13/2019    SUBJECTIVE:  Caryl Johnson is a 39 y.o. female  who presents for medication check.    She is doing ok on the phentermine. She was off of it for about 3 weeks as she felt like she hit a plateau on her weight loss.. She then started it again and this was about one month ago. She does feel that this has helped.  Her youngest will be 2 in April and so she has had difficulty working on incorporating exercise into her routine.  She does work on healthy diet as much as she can.  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      Chief Complaint   Patient presents with     Medication Management     Patient is here today to review current medications.          Patient Active Problem List   Diagnosis     Common Migraine (Without Aura)     Irregular Length Of Menstrual Periods     Obesity (BMI 30.0-34.9)       Current Outpatient Medications   Medication Sig Dispense Refill     phentermine  (ADIPEX-P) 37.5 mg tablet Take 1 tablet (37.5 mg total) by mouth daily before breakfast. 30 tablet 2     valACYclovir (VALTREX) 500 MG tablet Take 1 tablet (500 mg total) by mouth 2 (two) times a day. For 3 days at first sign of out break 6 tablet 2     No current facility-administered medications for this visit.        Social History     Tobacco Use   Smoking Status Never Smoker   Smokeless Tobacco Never Used           OBJECTIVE:        No results found for this or any previous visit (from the past 240 hour(s)).    Vitals:    02/13/19 0824   BP: 122/70   Patient Site: Right Arm   Patient Position: Sitting   Cuff Size: Adult Regular   Pulse: 82   SpO2: 100%   Weight: 196 lb 14.4 oz (89.3 kg)     Weight: 196 lb 14.4 oz (89.3 kg)          Physical Exam:  GENERAL APPEARANCE: A&A, NAD, well hydrated, well nourished  SKIN:  Normal skin turgor, no lesions/rashes   HEENT: moist mucous membranes, no rhinorrhea  NECK: Normal  CV: RRR, no M/G/R   LUNGS: CTAB  NEURO: no gross deficits   Psych: Her affect is appropriate, she is casually dressed and groomed, her thought process and speech pattern are normal, eye contact is normal

## 2021-06-25 NOTE — TELEPHONE ENCOUNTER
RN cannot approve Refill Request    RN can NOT refill this medication PCP messaged that patient is overdue for Labs. Last office visit: 2/13/2019 Caryl Garcia MD Last Physical: Visit date not found Last MTM visit: Visit date not found Last visit same specialty: 8/12/2019 Consuelo Larkin MD.  Next visit within 3 mo: Visit date not found  Next physical within 3 mo: Visit date not found      Daisy Lacy, [facility] Triage/Med Refill 6/1/2021    Requested Prescriptions   Pending Prescriptions Disp Refills     phentermine (ADIPEX-P) 37.5 mg tablet [Pharmacy Med Name: Phentermine HCl Oral Tablet 37.5 MG] 30 tablet 0     Sig: TAKE ONE TABLET BY MOUTH IN THE MORNING BEFORE BREAKFAST       There is no refill protocol information for this order        topiramate (TOPAMAX) 25 MG tablet [Pharmacy Med Name: Topiramate Oral Tablet 25 MG] 30 tablet 0     Sig: TAKE 1 TABLET (25 MG) BY MOUTH DAILY.       Topiramate Refill Protocol  Failed - 6/1/2021  2:41 PM        Failed - Bicarbonate/Electrolyte panel in last 12 months     Sodium   Date Value Ref Range Status   03/21/2018 137 136 - 145 mmol/L Final     Potassium   Date Value Ref Range Status   03/21/2018 4.3 3.5 - 5.0 mmol/L Final     Chloride   Date Value Ref Range Status   03/21/2018 104 98 - 107 mmol/L Final     CO2   Date Value Ref Range Status   03/21/2018 26 22 - 31 mmol/L Final                Failed - Serum creatinine in last 12 months     Creatinine   Date Value Ref Range Status   03/21/2018 0.70 0.60 - 1.10 mg/dL Final             Passed - PCP or prescribing provider visit in last 12 months or next 3 months     Last office visit with prescriber/PCP: 2/13/2019 Cayrl Garcia MD OR same dept: Visit date not found OR same specialty: 8/12/2019 Consuelo Larkin MD  Last physical: Visit date not found Last MTM visit: Visit date not found   Next visit within 3 mo: Visit date not found  Next physical within 3 mo: Visit date not  found  Prescriber OR PCP: Caryl Garcia MD  Last diagnosis associated with med order: 1. Obesity (BMI 30.0-34.9)  - phentermine (ADIPEX-P) 37.5 mg tablet [Pharmacy Med Name: Phentermine HCl Oral Tablet 37.5 MG]; TAKE ONE TABLET BY MOUTH IN THE MORNING BEFORE BREAKFAST  Dispense: 30 tablet; Refill: 0  - topiramate (TOPAMAX) 25 MG tablet [Pharmacy Med Name: Topiramate Oral Tablet 25 MG]; TAKE 1 TABLET (25 MG) BY MOUTH DAILY.  Dispense: 30 tablet; Refill: 0    If protocol passes may refill for 12 months if within 3 months of last provider visit (or a total of 15 months).

## 2021-06-26 ENCOUNTER — HEALTH MAINTENANCE LETTER (OUTPATIENT)
Age: 41
End: 2021-06-26

## 2021-06-26 NOTE — PROGRESS NOTES
Caryl Johnson is a 41 y.o. female who is being evaluated via a billable video visit.      How would you like to obtain your AVS? MyChart.  If dropped from the video visit, the video invitation should be resent by: Send to e-mail at: [email or web address]  Will anyone else be joining your video visit? No      Video Start Time: 9:34 AM    Assessment & Plan     Obesity (BMI 30.0-34.9)  -Doing well currently with the medication.  She will continue on her current medications, plan on following up between 3 to 4 months for her next recheck.  -She will continue to work on sleep hygiene, and let me know if things are not improving.  I do not think that this is related to her medication.  - phentermine (ADIPEX-P) 37.5 mg tablet  Dispense: 30 tablet; Refill: 3  - topiramate (TOPAMAX) 25 MG tablet  Dispense: 30 tablet; Refill: 3           No follow-ups on file.    Caryl Garcia MD  [facility]   Caryl Johnson is 41 y.o. and presents today for the following health issues   HPI     She is following up on phentermine. She is down 10 pounds right now. She has been feeling great. She has had no side effects. BP is ok as well. Her exercise has been good as well. Her boys got new bikes with their birthdays. IT's been nicer as well.     Shei s struggling with her sleep. Is waking up around the same time everynight. She will wake up 4-5/7 nights weekly. She does find this frustrating.She does note that it has been 2-3 months that this has been happening. Its between 2-3 AM, sometimes she will fall asleep right away again and sometimes she will be up tossing and turning. It doesn't matter what she eats/drinks before bed, tried tea before bed, melatonin and nothing is working. Previously she tried lunesta and it did not work, had the opposite effect on her. She does have a fan going.       Review of Systems    Per above      Objective    Vitals - Patient Reported  Systolic  (Patient Reported): 118  Diastolic (Patient Reported): 72  Weight (Patient Reported): 194 lb 14.4 oz (88.4 kg)  Pulse (Patient Reported): 74    Physical Exam    GENERAL: Healthy, alert and no distress  EYES: Eyes grossly normal to inspection. No discharge or erythema, or obvious scleral/conjunctival abnormalities.  RESP: No audible wheeze, cough, or visible cyanosis.  No visible retractions or increased work of breathing.    NEURO: Cranial nerves grossly intact. Mentation and speech appropriate for age.  PSYCH: Mentation appears normal, affect normal/bright, judgement and insight intact, normal speech and appearance well-groomed          Video-Visit Details    Type of service:  Video Visit    Video End Time (time video stopped): 9:43 AM  Originating Location (pt. Location): Home    Distant Location (provider location):  [facility]     Platform used for Video Visit: Interview Master

## 2021-10-16 ENCOUNTER — HEALTH MAINTENANCE LETTER (OUTPATIENT)
Age: 41
End: 2021-10-16

## 2022-02-08 ENCOUNTER — MYC REFILL (OUTPATIENT)
Dept: FAMILY MEDICINE | Facility: CLINIC | Age: 42
End: 2022-02-08
Payer: COMMERCIAL

## 2022-02-08 DIAGNOSIS — E66.811 OBESITY (BMI 30.0-34.9): ICD-10-CM

## 2022-02-09 RX ORDER — TOPIRAMATE 25 MG/1
25 TABLET, FILM COATED ORAL DAILY
Qty: 30 TABLET | Refills: 0 | Status: SHIPPED | OUTPATIENT
Start: 2022-02-09 | End: 2022-02-15

## 2022-02-09 RX ORDER — PHENTERMINE HYDROCHLORIDE 37.5 MG/1
37.5 TABLET ORAL
Qty: 30 TABLET | Refills: 0 | Status: SHIPPED | OUTPATIENT
Start: 2022-02-09 | End: 2022-02-15

## 2022-02-09 NOTE — TELEPHONE ENCOUNTER
"Routing refill request to provider for review/approval because:  Controlled substance request    Last Written Prescription Date:  6/7/21  Last Fill Quantity: 30,  # refills: 3   Last office visit provider:  6/7/21     Requested Prescriptions   Pending Prescriptions Disp Refills     phentermine (ADIPEX-P) 37.5 MG tablet 30 tablet 3     Sig: Take 1 tablet (37.5 mg) by mouth daily before breakfast       There is no refill protocol information for this order        topiramate (TOPAMAX) 25 MG tablet 30 tablet 3     Sig: Take 1 tablet (25 mg) by mouth daily       Anti-Seizure Meds Protocol  Failed - 2/8/2022  7:34 AM        Failed - Review Authorizing provider's last note.      Refer to last progress notes: confirm request is for original authorizing provider (cannot be through other providers).          Failed - Normal CBC on file in past 26 months     Recent Labs   Lab Test 03/21/18  1229   WBC 7.1   RBC 4.44   HGB 14.4   HCT 41.0                    Failed - Normal ALT or AST on file in past 26 months     Recent Labs   Lab Test 03/21/18  1229   ALT 19     Recent Labs   Lab Test 03/21/18  1229   AST 18             Failed - Normal platelet count on file in past 26 months     Recent Labs   Lab Test 03/21/18  1229                  Passed - Recent (12 mo) or future (30 days) visit within the authorizing provider's specialty     Patient has had an office visit with the authorizing provider or a provider within the authorizing providers department within the previous 12 mos or has a future within next 30 days. See \"Patient Info\" tab in inbasket, or \"Choose Columns\" in Meds & Orders section of the refill encounter.              Passed - Medication is active on med list        Passed - No active pregnancy on record        Passed - No positive pregnancy test in last 12 months             Raymundo Jennings RN 02/09/22 2:30 PM  "

## 2022-02-09 NOTE — TELEPHONE ENCOUNTER
"Routing refill request to provider for review/approval because:  Labs not current:  Multiple  A break in medication    Last Written Prescription Date:  6/7/21  Last Fill Quantity: 30,  # refills: 3   Last office visit provider:  6/7/21     Requested Prescriptions   Pending Prescriptions Disp Refills     phentermine (ADIPEX-P) 37.5 MG tablet 30 tablet 3     Sig: Take 1 tablet (37.5 mg) by mouth daily before breakfast       There is no refill protocol information for this order        topiramate (TOPAMAX) 25 MG tablet 30 tablet 3     Sig: Take 1 tablet (25 mg) by mouth daily       Anti-Seizure Meds Protocol  Failed - 2/8/2022  7:34 AM        Failed - Review Authorizing provider's last note.      Refer to last progress notes: confirm request is for original authorizing provider (cannot be through other providers).          Failed - Normal CBC on file in past 26 months     Recent Labs   Lab Test 03/21/18  1229   WBC 7.1   RBC 4.44   HGB 14.4   HCT 41.0                    Failed - Normal ALT or AST on file in past 26 months     Recent Labs   Lab Test 03/21/18  1229   ALT 19     Recent Labs   Lab Test 03/21/18  1229   AST 18             Failed - Normal platelet count on file in past 26 months     Recent Labs   Lab Test 03/21/18  1229                  Passed - Recent (12 mo) or future (30 days) visit within the authorizing provider's specialty     Patient has had an office visit with the authorizing provider or a provider within the authorizing providers department within the previous 12 mos or has a future within next 30 days. See \"Patient Info\" tab in inbasket, or \"Choose Columns\" in Meds & Orders section of the refill encounter.              Passed - Medication is active on med list        Passed - No active pregnancy on record        Passed - No positive pregnancy test in last 12 months             Raymundo Jennings RN 02/09/22 2:30 PM  "

## 2022-02-15 ENCOUNTER — VIRTUAL VISIT (OUTPATIENT)
Dept: FAMILY MEDICINE | Facility: CLINIC | Age: 42
End: 2022-02-15
Payer: COMMERCIAL

## 2022-02-15 DIAGNOSIS — E66.811 OBESITY (BMI 30.0-34.9): ICD-10-CM

## 2022-02-15 PROCEDURE — 99213 OFFICE O/P EST LOW 20 MIN: CPT | Mod: 95 | Performed by: FAMILY MEDICINE

## 2022-02-15 RX ORDER — PHENTERMINE HYDROCHLORIDE 37.5 MG/1
37.5 TABLET ORAL
Qty: 30 TABLET | Refills: 3 | Status: SHIPPED | OUTPATIENT
Start: 2022-02-15 | End: 2022-08-30

## 2022-02-15 RX ORDER — TOPIRAMATE 50 MG/1
50 TABLET, FILM COATED ORAL DAILY
Qty: 30 TABLET | Refills: 3 | Status: SHIPPED | OUTPATIENT
Start: 2022-02-15 | End: 2022-08-30

## 2022-02-15 NOTE — PROGRESS NOTES
Caryl is a 42 year old who is being evaluated via a billable video visit.      How would you like to obtain your AVS? MyChart  If the video visit is dropped, the invitation should be resent by: Text to cell phone: [phone]  Will anyone else be joining your video visit? No      Video Start Time: 1:46 PM    Assessment & Plan     Obesity (BMI 30.0-34.9)  -Medication is helping some with weight loss. She is agreeable to increasing her dose of topamax to 50 mg daily, will continue with the phentermine as well and if doing well she will f/u with me in 3-4 months. If not doing well she will let me know sooner and we can adjust medication.   - topiramate (TOPAMAX) 50 MG tablet  Dispense: 30 tablet; Refill: 3  - phentermine (ADIPEX-P) 37.5 MG tablet  Dispense: 30 tablet; Refill: 3             Return in about 3 months (around 5/15/2022) for Follow up, using a video visit.    Caryl Garcia MD  [facility]   Caryl is a 42 year old who presents for the following health issues     HPI     She is feeling well generally. She did stop the topamax for a period of time, did go up in weight, so refilled it again. Prior to stopping she was doing well. She does think that the combo is working ok for her. She just restarted the topiramate and the phentermine on 2/9.     She denies any side effects.       Review of Systems   Per above      Objective             Physical Exam   GENERAL: Healthy, alert and no distress  EYES: Eyes grossly normal to inspection.  No discharge or erythema, or obvious scleral/conjunctival abnormalities.  RESP: No audible wheeze, cough, or visible cyanosis.  No visible retractions or increased work of breathing.    SKIN: Visible skin clear. No significant rash, abnormal pigmentation or lesions.  NEURO: Cranial nerves grossly intact.  Mentation and speech appropriate for age.  PSYCH: Mentation appears normal, affect normal/bright, judgement and insight intact,  normal speech and appearance well-groomed.              Video-Visit Details    Type of service:  Video Visit    Video End Time:1:53 PM    Originating Location (pt. Location): Home    Distant Location (provider location):  [facility]     Platform used for Video Visit: [employer]Well

## 2022-02-28 ENCOUNTER — OFFICE VISIT (OUTPATIENT)
Dept: FAMILY MEDICINE | Facility: CLINIC | Age: 42
End: 2022-02-28
Payer: COMMERCIAL

## 2022-02-28 VITALS
OXYGEN SATURATION: 100 % | HEIGHT: 64 IN | HEART RATE: 73 BPM | WEIGHT: 210 LBS | DIASTOLIC BLOOD PRESSURE: 88 MMHG | BODY MASS INDEX: 35.85 KG/M2 | SYSTOLIC BLOOD PRESSURE: 126 MMHG

## 2022-02-28 DIAGNOSIS — E66.811 OBESITY (BMI 30.0-34.9): ICD-10-CM

## 2022-02-28 DIAGNOSIS — G43.009 MIGRAINE WITHOUT AURA AND WITHOUT STATUS MIGRAINOSUS, NOT INTRACTABLE: ICD-10-CM

## 2022-02-28 DIAGNOSIS — Z12.31 ENCOUNTER FOR SCREENING MAMMOGRAM FOR BREAST CANCER: ICD-10-CM

## 2022-02-28 DIAGNOSIS — Z00.00 ROUTINE GENERAL MEDICAL EXAMINATION AT A HEALTH CARE FACILITY: Primary | ICD-10-CM

## 2022-02-28 DIAGNOSIS — Z23 NEED FOR HEPATITIS A VACCINATION: ICD-10-CM

## 2022-02-28 DIAGNOSIS — G47.00 INSOMNIA, UNSPECIFIED TYPE: ICD-10-CM

## 2022-02-28 DIAGNOSIS — N95.1 MENOPAUSAL SYMPTOMS: ICD-10-CM

## 2022-02-28 LAB
ALBUMIN SERPL-MCNC: 4.1 G/DL (ref 3.5–5)
ALBUMIN UR-MCNC: NEGATIVE MG/DL
ALP SERPL-CCNC: 94 U/L (ref 45–120)
ALT SERPL W P-5'-P-CCNC: 16 U/L (ref 0–45)
ANION GAP SERPL CALCULATED.3IONS-SCNC: 11 MMOL/L (ref 5–18)
APPEARANCE UR: CLEAR
AST SERPL W P-5'-P-CCNC: 17 U/L (ref 0–40)
BILIRUB SERPL-MCNC: 0.4 MG/DL (ref 0–1)
BILIRUB UR QL STRIP: NEGATIVE
BUN SERPL-MCNC: 14 MG/DL (ref 8–22)
CALCIUM SERPL-MCNC: 9.8 MG/DL (ref 8.5–10.5)
CHLORIDE BLD-SCNC: 108 MMOL/L (ref 98–107)
CHOLEST SERPL-MCNC: 165 MG/DL
CO2 SERPL-SCNC: 21 MMOL/L (ref 22–31)
COLOR UR AUTO: YELLOW
CREAT SERPL-MCNC: 0.86 MG/DL (ref 0.6–1.1)
FASTING STATUS PATIENT QL REPORTED: NORMAL
GFR SERPL CREATININE-BSD FRML MDRD: 87 ML/MIN/1.73M2
GLUCOSE BLD-MCNC: 99 MG/DL (ref 70–125)
GLUCOSE UR STRIP-MCNC: NEGATIVE MG/DL
HDLC SERPL-MCNC: 51 MG/DL
HGB BLD-MCNC: 13.6 G/DL (ref 11.7–15.7)
HGB UR QL STRIP: ABNORMAL
KETONES UR STRIP-MCNC: NEGATIVE MG/DL
LDLC SERPL CALC-MCNC: 96 MG/DL
LEUKOCYTE ESTERASE UR QL STRIP: NEGATIVE
NITRATE UR QL: NEGATIVE
PH UR STRIP: 7 [PH] (ref 5–8)
POTASSIUM BLD-SCNC: 3.9 MMOL/L (ref 3.5–5)
PROT SERPL-MCNC: 7.7 G/DL (ref 6–8)
SODIUM SERPL-SCNC: 140 MMOL/L (ref 136–145)
SP GR UR STRIP: 1.01 (ref 1–1.03)
TRIGL SERPL-MCNC: 92 MG/DL
UROBILINOGEN UR STRIP-ACNC: 0.2 E.U./DL

## 2022-02-28 PROCEDURE — 85018 HEMOGLOBIN: CPT | Performed by: FAMILY MEDICINE

## 2022-02-28 PROCEDURE — G0123 SCREEN CERV/VAG THIN LAYER: HCPCS | Performed by: FAMILY MEDICINE

## 2022-02-28 PROCEDURE — 87624 HPV HI-RISK TYP POOLED RSLT: CPT | Performed by: FAMILY MEDICINE

## 2022-02-28 PROCEDURE — 80061 LIPID PANEL: CPT | Performed by: FAMILY MEDICINE

## 2022-02-28 PROCEDURE — 90632 HEPA VACCINE ADULT IM: CPT | Performed by: FAMILY MEDICINE

## 2022-02-28 PROCEDURE — 99396 PREV VISIT EST AGE 40-64: CPT | Mod: 25 | Performed by: FAMILY MEDICINE

## 2022-02-28 PROCEDURE — 90471 IMMUNIZATION ADMIN: CPT | Performed by: FAMILY MEDICINE

## 2022-02-28 PROCEDURE — 36415 COLL VENOUS BLD VENIPUNCTURE: CPT | Performed by: FAMILY MEDICINE

## 2022-02-28 PROCEDURE — 99213 OFFICE O/P EST LOW 20 MIN: CPT | Mod: 25 | Performed by: FAMILY MEDICINE

## 2022-02-28 PROCEDURE — 81001 URINALYSIS AUTO W/SCOPE: CPT | Performed by: FAMILY MEDICINE

## 2022-02-28 PROCEDURE — 80053 COMPREHEN METABOLIC PANEL: CPT | Performed by: FAMILY MEDICINE

## 2022-02-28 RX ORDER — TRAZODONE HYDROCHLORIDE 50 MG/1
50 TABLET, FILM COATED ORAL AT BEDTIME
Qty: 90 TABLET | Refills: 0 | Status: SHIPPED | OUTPATIENT
Start: 2022-02-28

## 2022-02-28 NOTE — PROGRESS NOTES
SUBJECTIVE:   CC: Caryl Johnson is an 42 year old woman who presents for preventive health visit.       Patient has been advised of split billing requirements and indicates understanding: Yes  Healthy Habits:     Getting at least 3 servings of Calcium per day:  Yes    Bi-annual eye exam:  Yes    Dental care twice a year:  Yes    Sleep apnea or symptoms of sleep apnea:  Daytime drowsiness    Diet:  Regular (no restrictions)    Frequency of exercise:  2-3 days/week    Duration of exercise:  30-45 minutes    Taking medications regularly:  No    Barriers to taking medications:  None    Medication side effects:  None    PHQ-2 Total Score: 0    Additional concerns today:  Yes  Contraception      Patient is here today for physical exam.  She overall is doing well.  She recently was  and that seems to be doing well.  She gets along very well with her ex and they seem to be coparenting well.  They both live in [place] so is very easy for them to coparent.  There is 50-50 custody and so she has her kids about 50% of the time.  She recently got a new job at [employer] and is liking a lot.  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  She thinks that that is going well and is quite pleased that she made the change to [employer] again.  She does note that she is having difficulty with staying asleep.  She falls asleep fine but she has a hard time staying asleep.  Her mind always seems to wander.  She wakes up in the middle the night and then just cannot get back to sleep.  She probably gets about 5-1/2 to 6 hours of sleep at night and she notes that is often not enough.  She is wondering if there is anything else that she can do to help her.  She has tried melatonin and that did not really seem like it helped.  She did use NyQuil and that helps but then she has a tremendous hangover the next day.  She does not really feel like she has anxiety and  she feels like overall her stress levels doing okay but she cannot really get her mind to shut off.  She is not suicidal or homicidal at all.  She and I discussed counseling it does not really feel like she needs that.  She just is wanting something that she can take that might help her sleep a little bit better.  She also has a history of migraine headaches.  She is getting about 1 or 2 of them about every 2 to 3 months or so.  She has no prescription for some kind of medication at home that she uses when she needs it and she has plenty of the medication at this time.  She cannot member the exact name of that.  She feels like overall her migraines are doing well.  She does use Valtrex when she needs it for cold sores but she has not needed it for quite some time.  She has enough of it at home.  She currently is on Topamax as well as phentermine for weight loss.  She sees Dr. Garcia for that.  They recently increased her Topamax to 50 mg a day and she seems to be tolerating that okay.  We will draw labs today to follow-up on that.  She has had hepatitis B vaccinations but she is never had hepatitis A vaccinations.  We discussed that at length and she wants to begin that series of immunizations today.  Her mom did die of colon cancer in her 40s.  Patient is due for another colonoscopy next year as she needs to repeat them every 5 years.  Her last one was in 2018.  She is wondering about getting a Mirena to help her with getting through menopause.  She notes that she is not currently sexually active however she notes that her periods are very very heavy and do not last very long at all.  She and I discussed the fact that probably is due to the fact that she is now age 42 instead of age 22.  We did talk with the fact that Mirena can definitely help her get through menopause and helps a lot of women get through menopause.  We did discuss the pros and cons of IUD at great length today.  We did discuss the potential side  effects that she can have with an IUD as well.  She has not had a Pap smear since 2017 so we will go ahead and order that.  Is also due for mammogram and that was ordered today.     Today's PHQ-2 Score:   PHQ-2 ( 1999 Pfizer) 2/28/2022   Q1: Little interest or pleasure in doing things 0   Q2: Feeling down, depressed or hopeless 0   PHQ-2 Score 0   Q1: Little interest or pleasure in doing things Not at all   Q2: Feeling down, depressed or hopeless Not at all   PHQ-2 Score 0       Abuse: Current or Past (Physical, Sexual or Emotional) - No  Do you feel safe in your environment? Yes    Have you ever done Advance Care Planning? (For example, a Health Directive, POLST, or a discussion with a medical provider or your loved ones about your wishes): No, advance care planning information given to patient to review.  Patient plans to discuss their wishes with loved ones or provider.      Social History     Tobacco Use     Smoking status: Never Smoker     Smokeless tobacco: Never Used   Substance Use Topics     Alcohol use: Yes     Alcohol/week: 10.0 standard drinks     Types: 10 Standard drinks or equivalent per week     If you drink alcohol do you typically have >3 drinks per day or >7 drinks per week? Yes, estimates 10 drinks a week, no concerns currently      Alcohol Use 2/28/2022   Prescreen: >3 drinks/day or >7 drinks/week? Yes   Prescreen: >3 drinks/day or >7 drinks/week? -   AUDIT SCORE  4     AUDIT - Alcohol Use Disorders Identification Test - Reproduced from the World Health Organization Audit 2001 (Second Edition) 2/28/2022   1.  How often do you have a drink containing alcohol? 2 to 3 times a week   2.  How many drinks containing alcohol do you have on a typical day when you are drinking? 1 or 2   3.  How often do you have five or more drinks on one occasion? Less than monthly   4.  How often during the last year have you found that you were not able to stop drinking once you had started? Never   5.  How often  during the last year have you failed to do what was normally expected of you because of drinking? Never   6.  How often during the last year have you needed a first drink in the morning to get yourself going after a heavy drinking session? Never   7.  How often during the last year have you had a feeling of guilt or remorse after drinking? Never   8.  How often during the last year have you been unable to remember what happened the night before because of your drinking? Never   9.  Have you or someone else been injured because of your drinking? No   10. Has a relative, friend, doctor or other health care worker been concerned about your drinking or suggested you cut down? No   TOTAL SCORE 4       Reviewed orders with patient.  Reviewed health maintenance and updated orders accordingly - Yes    Breast Cancer Screening:    FHS-7:   Breast CA Risk Assessment (FHS-7) 2/28/2022   Did any of your first-degree relatives have breast or ovarian cancer? No   Did any of your relatives have bilateral breast cancer? Yes   Did any man in your family have breast cancer? No   Did any woman in your family have breast and ovarian cancer? Yes   Did any woman in your family have breast cancer before age 50 y? No   Do you have 2 or more relatives with breast and/or ovarian cancer? No   Do you have 2 or more relatives with breast and/or bowel cancer? Yes       Mammogram Screening - Offered annual screening and updated Health Maintenance for mutual plan based on risk factor consideration    Pertinent mammograms are reviewed under the imaging tab.    History of abnormal Pap smear: NO - age 30- 65 PAP every 3 years recommended  PAP / HPV 4/25/2017 8/17/2016 5/29/2015   PAP Negative for squamous intraepithelial lesion or malignancy  Electronically signed by Dodie Lagunas CT (ASCP) on 5/3/2017 at 12:53 PM   Negative for squamous intraepithelial lesion or malignancy  Electronically signed by Tracey Bernard CT (ASCP) on 8/29/2016 at   3:21 PM   Negative for squamous intraepithelial lesion or malignancy  Electronically signed by Marni Martinez CT (ASCP) on 2015 at  6:27 AM       Reviewed and updated as needed this visit by clinical staff   Tobacco  Allergies  Meds   Med Hx  Surg Hx  Fam Hx  Soc Hx        Reviewed and updated as needed this visit by Provider   Tobacco  Allergies  Meds   Med Hx  Surg Hx  Fam Hx  Soc Hx         Current Outpatient Medications:      phentermine (ADIPEX-P) 37.5 MG tablet, Take 1 tablet (37.5 mg) by mouth daily before breakfast, Disp: 30 tablet, Rfl: 3     topiramate (TOPAMAX) 50 MG tablet, Take 1 tablet (50 mg) by mouth daily, Disp: 30 tablet, Rfl: 3     traZODone (DESYREL) 50 MG tablet, Take 1 tablet (50 mg) by mouth At Bedtime, Disp: 90 tablet, Rfl: 0     No Known Allergies     Past Medical History:   Diagnosis Date     Elevated blood pressure complicating pregnancy, antepartum 2015     Family history of colon cancer in mother     needs colonoscopy every 5 yrs per [facility], had      H/O colonoscopy 2018    lymphoid aggregates found, benign, repeat 5 yrs due to family history of colon cancer     Herpes genitalia     on valtrex when needed for outbreaks     Infertility, anovulation     used clomid to concieve first 3 pregnancies, first 2 were miscarriages     Proteinuria complicating pregnancy 2015    also leg swelling     Unspecified spontaneous  without mention of complication     x2, 2014, 2014        Past Surgical History:   Procedure Laterality Date      SECTION  2015    failure to progress beyond 8 cm, elevated blood pressures after delivery      SECTION N/A 2017    Procedure: REPEAT  SECTION ;  Surgeon: Hank Pleitez MD;  Location: [facility];  Service:      INGUINAL HERNIA REPAIR Left     3rd grade, used mesh        Family History   Problem Relation Age of Onset     Colon Cancer Mother         Colorectal cancer,   2004, dx in her 40's     Hyperlipidemia Father      Hypertension Father      Kidney Disease Father      Breast Cancer Paternal Grandmother         Diagnosed age 60s     Uterine Cancer Paternal Grandmother         Diagnosed age 70s     Heart Disease Paternal Grandfather         MI, CABG ×4     Hypertension Paternal Grandfather      Kidney Cancer Maternal Uncle      Prostate Cancer Maternal Uncle      Liver Cancer Paternal Uncle         Social History     Socioeconomic History     Marital status:      Spouse name: Not on file     Number of children: 2     Years of education: Not on file     Highest education level: Not on file   Occupational History     Occupation: Clinic Supervisor     Comment: [employer] [place] [employer]   Tobacco Use     Smoking status: Never Smoker     Smokeless tobacco: Never Used   Vaping Use     Vaping Use: Never used   Substance and Sexual Activity     Alcohol use: Yes     Alcohol/week: 10.0 standard drinks     Types: 10 Standard drinks or equivalent per week     Drug use: No     Sexual activity: Not Currently     Partners: Male   Other Topics Concern     Not on file   Social History Narrative     Not on file     Social Determinants of Health     Financial Resource Strain: Not on file   Food Insecurity: Not on file   Transportation Needs: Not on file   Physical Activity: Not on file   Stress: Not on file   Social Connections: Not on file   Intimate Partner Violence: Not on file   Housing Stability: Not on file        Immunization History   Administered Date(s) Administered     COVID-19,PF,Pfizer (12+ Yrs) 12/22/2020, 01/12/2021, 10/20/2021     DT (PEDS <7y) 05/14/1998     Flu, Unspecified 10/15/2013, 11/05/2014, 09/26/2017, 09/24/2018     Hep B, Peds or Adolescent 05/04/2005, 09/20/2005, 11/29/2005     HepA-Adult 02/28/2022     Historical DTP/aP 1982, 1982, 1982, 09/07/1983, 06/18/1987     Influenza Vaccine IM > 6 months Valent IIV4 (Alfuria,Fluzone) 10/14/2016, 09/26/2017,  "2018, 2019, 2020, 2021     MMR 1985, 1994     Mantoux Tuberculin Skin Test 2009     OPV, trivalent, live 1982, 1982, 1983, 1987     Tdap (Adacel,Boostrix) 2009, 2015, 2016        OB History    Para Term  AB Living   4 2 2 0 2 2   SAB IAB Ectopic Multiple Live Births   2 0 0 0 2      # Outcome Date GA Lbr Rahat/2nd Weight Sex Delivery Anes PTL Lv   4 Term 17 39w5d  3.8 kg (8 lb 6 oz) M CS-LTranv Spinal N LUKE      Birth Comments: neg GBS, repeat csection      Name: Chad      Apgar1: 9  Apgar5: 9   3 Term 04/09/15 39w2d 24:00 3.175 kg (7 lb) M CS-LTranv EPI N LUKE      Birth Comments: GBS neg, induced for proteinuria swelling and increased BP, cervidil, SROM, pit, got to 8 cm and then stalled so csection      Complications: Failure to Progress in First Stage      Name: Basil      Apgar1: 8  Apgar5: 9   2 SAB 2014 7w0d             Birth Comments: no complications, on clomid to concieve   1 SAB 2014 6w0d             Birth Comments: no complications, on clomid to concieve          Review of Systems   see below     OBJECTIVE:   /88   Pulse 73   Ht 1.619 m (5' 3.75\")   Wt 95.3 kg (210 lb)   LMP 2022 (Exact Date)   SpO2 100%   BMI 36.33 kg/m    Physical Exam  REVIEW OF SYSTEMS:  Denies fever, chills, visual changes, fatigue, myalgias, nasal congestion, rhinorrhea, ear pain or discharge, sore throat, swollen glands, breast mass, nipple discharge, breast changes, abdominal pain, nausea, vomiting, diarrhea, constipation, cough, shortness of breath, chest pain, weight change, change in bowel habits, melena, rectal bleeding, dysuria, frequency, urgency, hematuria, polyuria, polydipsia, polyphagia, joint pain or swelling or erythema, edema, rash, weakness, paresthesias, vaginal discharge or bleeding or mood changes other than that mentioned above in HPI.   Remainder of review of systems was " "negative.      PHYSICAL EXAM:  On exam, patient is a WD, WN 42 year old female in NAD.  /88   Pulse 73   Ht 1.619 m (5' 3.75\")   Wt 95.3 kg (210 lb)   LMP 02/25/2022 (Exact Date)   SpO2 100%   BMI 36.33 kg/m    Head normocephalic, atraumatic.  Eyes PERRL, ears TM s clear bilaterally.  Throat without significant erythemia or exudate.  Neck was supple, full range of motion. No significant lymphadenopathy or thyromegaly was appreciated.  Lungs clear to auscultation  Heart regular rate and rhythm.  Breast exam was done. No masses were appreciated. Axilla were clear bilaterally. No nipple discharge was appreciated. Self breast exam was reviewed and taught today.  Abdomen was soft, nontender, nondistended. No masses or organomegaly were palpated. Positive bowel sounds were appreciated.  Extremities with full range of motion of all 4 extremities were noted.  Deep tendon reflexes were equal and symmetrical. Motor and sensation were intact to both the upper and lower extremities.  Cranial nerves 2 through 12 were grossly intact.  EOM were intact.  Pelvic exam was done.  External genitalia appeared normal. Speculum was introduced and the Pap smear obtained. Bimanual exam revealed uterus to be normal size and adenexa without palpable masses.   Abdominal and pelvic exam limited secondary to patient habitus.   A&O x3, thought processes congruent, non-tangential. No hallucinations/delusions. Insight/judgment: intact. Denies suicidal/homicidal ideations.      LABS:    Recent Results (from the past 240 hour(s))   Hemoglobin    Collection Time: 02/28/22  3:39 PM   Result Value Ref Range    Hemoglobin 13.6 11.7 - 15.7 g/dL   Lipid panel reflex to direct LDL Fasting    Collection Time: 02/28/22  3:39 PM   Result Value Ref Range    Cholesterol 165 <=199 mg/dL    Triglycerides 92 <=149 mg/dL    Direct Measure HDL 51 >=50 mg/dL    LDL Cholesterol Calculated 96 <=129 mg/dL    Patient Fasting > 8hrs? Unknown    Comprehensive " metabolic panel    Collection Time: 02/28/22  3:39 PM   Result Value Ref Range    Sodium 140 136 - 145 mmol/L    Potassium 3.9 3.5 - 5.0 mmol/L    Chloride 108 (H) 98 - 107 mmol/L    Carbon Dioxide (CO2) 21 (L) 22 - 31 mmol/L    Anion Gap 11 5 - 18 mmol/L    Urea Nitrogen 14 8 - 22 mg/dL    Creatinine 0.86 0.60 - 1.10 mg/dL    Calcium 9.8 8.5 - 10.5 mg/dL    Glucose 99 70 - 125 mg/dL    Alkaline Phosphatase 94 45 - 120 U/L    AST 17 0 - 40 U/L    ALT 16 0 - 45 U/L    Protein Total 7.7 6.0 - 8.0 g/dL    Albumin 4.1 3.5 - 5.0 g/dL    Bilirubin Total 0.4 0.0 - 1.0 mg/dL    GFR Estimate 87 >60 mL/min/1.73m2   UA reflex to Microscopic and Culture    Collection Time: 02/28/22  3:45 PM    Specimen: Urine, Midstream   Result Value Ref Range    Color Urine Yellow Colorless, Straw, Light Yellow, Yellow    Appearance Urine Clear Clear    Glucose Urine Negative Negative mg/dL    Bilirubin Urine Negative Negative    Ketones Urine Negative Negative mg/dL    Specific Gravity Urine 1.015 1.005 - 1.030    Blood Urine Moderate (A) Negative    pH Urine 7.0 5.0 - 8.0    Protein Albumin Urine Negative Negative mg/dL    Urobilinogen Urine 0.2 0.2, 1.0 E.U./dL    Nitrite Urine Negative Negative    Leukocyte Esterase Urine Negative Negative   Urine Microscopic    Collection Time: 02/28/22  3:45 PM   Result Value Ref Range    Bacteria Urine None Seen None Seen /HPF    RBC Urine 0-2 0-2 /HPF /HPF    WBC Urine 0-5 0-5 /HPF /HPF    Squamous Epithelials Urine Few (A) None Seen /LPF         ASSESSMENT/PLAN:   ASSESSMENT: 42 year old female physical exam and pap smear.    PLAN:     Routine general medical examination at a health care facility [Z00.00]    1. Routine general medical examination at a health care facility  - Hemoglobin; Future  - Lipid panel reflex to direct LDL Fasting; Future  - UA reflex to Microscopic and Culture; Future  - Gynecologic Cytology (PAP)  - Hemoglobin  - Lipid panel reflex to direct LDL Fasting  - UA reflex to  Microscopic and Culture  - Urine Microscopic  - HPV High Risk Types DNA Cervical    2. Migraine without aura and without status migrainosus, not intractable  - Comprehensive metabolic panel; Future  - Comprehensive metabolic panel    3. Insomnia, unspecified type  - traZODone (DESYREL) 50 MG tablet; Take 1 tablet (50 mg) by mouth At Bedtime  Dispense: 90 tablet; Refill: 0    4. Menopausal symptoms    5. Obesity (BMI 30.0-34.9)  - Comprehensive metabolic panel; Future  - Comprehensive metabolic panel    6. Encounter for screening mammogram for breast cancer  - *MA Screening Digital Bilateral; Future    7. Need for hepatitis A vaccination  - HEPATITIS A VACCINE (ADULT)      Patient is a 42 year old female who is overall doing well.  Patient does have a history of migraine headaches and is currently doing well.  She is on a medication that she has from a long time ago she does not get migraine headaches very frequently.  She maybe gets 1 or 2 every 2 to 3 months.  Feels like overall things are doing well in that regard and will let me know if she has additional assistance.  She cannot member the name of the medication at this time.  She does have a history of insomnia and is having difficulty staying asleep.  She finds that her mind wanders a lot.  She is tried melatonin as well as NyQuil.  NyQuil gives her hangover and melatonin has not been helpful for her.  We will go ahead and try her on some trazodone.  We talked about the risk and benefits of trazodone.  She should try it on the weekend when she does not have her kids so that if she does have a reaction to it she can at least sleep the day.  She will try that and see how she does.  If this works well she certainly should let me know and I be happy to refill her medications.  She is noticing that she is having heavier menstrual cycles that last for shorter time.  We talked about the fact that probably has to do with her age.  She is interested in maybe getting a  "Mirena IUD.  We talked about an IUD in detail today.  She does see Dr. Garcia for weight management and so we will set up an appoint with Dr. Stewart for an ID consult as well.  Metabolic panel was drawn for the fact that she takes phentermine and Topamax today.  We will contact her with results of boundaries terms.  She is due for mammogram which was ordered today.  She did begin hepatitis A vaccination series.  She did get her first vaccination today and will return in 6 months to get her second.  She does have a history of cold sores and does use Valtrex on a as needed basis.  She does not need a refill of that currently.  Her mom did die of colon cancer at the age of 41.  We will plan to do colonoscopy again next years and it will have been 5 years since her last one in 2018.  All of her questions and concerns were addressed today.  If she has additional problems or concerns should let me know.    Will contact her with the results of the labs when available.    Voice recognition software was used in the creation of this note. Any grammatical or nonsensical errors are due to inherent errors with the software and are not the intention of the writer.      Maryanne Bronson MD      Patient has been advised of split billing requirements and indicates understanding: Yes    COUNSELING:  Reviewed preventive health counseling, as reflected in patient instructions       Regular exercise       Healthy diet/nutrition       Immunizations    Vaccinated for: Hepatitis A             Contraception       Colorectal Cancer Screening       (Cristela)menopause management    Estimated body mass index is 36.33 kg/m  as calculated from the following:    Height as of this encounter: 1.619 m (5' 3.75\").    Weight as of this encounter: 95.3 kg (210 lb).    Weight management plan: Discussed healthy diet and exercise guidelines    She reports that she has never smoked. She has never used smokeless tobacco.      Counseling Resources:  ATP IV " Guidelines  Pooled Cohorts Equation Calculator  Breast Cancer Risk Calculator  BRCA-Related Cancer Risk Assessment: FHS-7 Tool  FRAX Risk Assessment  ICSI Preventive Guidelines  Dietary Guidelines for Americans, 2010  USDA's MyPlate  ASA Prophylaxis  Lung CA Screening    Maryanne Bronson MD  [facility]

## 2022-03-01 LAB
BACTERIA #/AREA URNS HPF: ABNORMAL /HPF
RBC #/AREA URNS AUTO: ABNORMAL /HPF
SQUAMOUS #/AREA URNS AUTO: ABNORMAL /LPF
WBC #/AREA URNS AUTO: ABNORMAL /HPF

## 2022-03-02 LAB
HUMAN PAPILLOMA VIRUS 16 DNA: NEGATIVE
HUMAN PAPILLOMA VIRUS 18 DNA: NEGATIVE
HUMAN PAPILLOMA VIRUS FINAL DIAGNOSIS: NORMAL
HUMAN PAPILLOMA VIRUS OTHER HR: NEGATIVE

## 2022-03-04 LAB
BKR LAB AP GYN ADEQUACY: NORMAL
BKR LAB AP GYN INTERPRETATION: NORMAL
BKR LAB AP HPV REFLEX: NORMAL
BKR LAB AP PREVIOUS ABNORMAL: NORMAL
PATH REPORT.COMMENTS IMP SPEC: NORMAL
PATH REPORT.COMMENTS IMP SPEC: NORMAL
PATH REPORT.RELEVANT HX SPEC: NORMAL

## 2022-06-09 ENCOUNTER — HOSPITAL ENCOUNTER (OUTPATIENT)
Dept: MAMMOGRAPHY | Facility: CLINIC | Age: 42
Discharge: HOME | End: 2022-06-09
Attending: FAMILY MEDICINE | Admitting: FAMILY MEDICINE
Payer: COMMERCIAL

## 2022-06-09 DIAGNOSIS — Z12.31 ENCOUNTER FOR SCREENING MAMMOGRAM FOR BREAST CANCER: ICD-10-CM

## 2022-06-09 PROCEDURE — 77067 SCR MAMMO BI INCL CAD: CPT

## 2022-08-30 ENCOUNTER — VIRTUAL VISIT (OUTPATIENT)
Dept: FAMILY MEDICINE | Facility: CLINIC | Age: 42
End: 2022-08-30
Payer: COMMERCIAL

## 2022-08-30 DIAGNOSIS — E66.811 OBESITY (BMI 30.0-34.9): ICD-10-CM

## 2022-08-30 PROCEDURE — 99213 OFFICE O/P EST LOW 20 MIN: CPT | Mod: 95 | Performed by: PHYSICIAN ASSISTANT

## 2022-08-30 RX ORDER — PHENTERMINE HYDROCHLORIDE 37.5 MG/1
37.5 TABLET ORAL
Qty: 30 TABLET | Refills: 1 | Status: SHIPPED | OUTPATIENT
Start: 2022-08-30 | End: 2023-01-26

## 2022-08-30 RX ORDER — TOPIRAMATE 50 MG/1
50 TABLET, FILM COATED ORAL DAILY
Qty: 30 TABLET | Refills: 1 | Status: SHIPPED | OUTPATIENT
Start: 2022-08-30 | End: 2023-01-26

## 2022-08-30 NOTE — PROGRESS NOTES
"Cintia is a 42 year old who is being evaluated via a billable video visit.      How would you like to obtain your AVS? MyChart  If the video visit is dropped, the invitation should be resent by: Other e-mail: [phone]  Will anyone else be joining your video visit? No        Assessment & Plan     Obesity (BMI 30.0-34.9)  Advised that I will give her a refill of her medication today but recommend that she reestablish with a new provider in her area for weight loss moving forward if her previous provider has left. She should be seen Q 3 months for weight and BP measurements while taking phentermine.  Her weight has been stable but she hasn't lost any weight since her last visit and so reevaluation and possible discussion of new medication may be warranted. She is agreeable   Offered her a new referral for weight management clinic and this was given today.     - phentermine (ADIPEX-P) 37.5 MG tablet; Take 1 tablet (37.5 mg) by mouth daily before breakfast  - topiramate (TOPAMAX) 50 MG tablet; Take 1 tablet (50 mg) by mouth daily  - Comprehensive Weight Management; Future       BMI:   Estimated body mass index is 36.33 kg/m  as calculated from the following:    Height as of 2/28/22: 1.619 m (5' 3.75\").    Weight as of 2/28/22: 95.3 kg (210 lb).       Follow up as above  [facility]    Sanjiv Chamberlain is a 42 year old, presenting for the following health issues:  Medication Refill (Medication refill on phentermine.)      HPI     Cintia presents to the clinic via a video visit for a refill of phentermine and topamax.   She has been taking these medications for weight loss.  Takes then \" off and on\" for periods of time.  Last visit with Dr. Garcia for these medications was in February.  She was supposed to schedule a 3 month follow up but when she tried to schedule today she could not find her provider to schedule with and so she made a virtual visit with me for a " refill.   Weight has been stable at 209 lbs, BP today was 120s/70s. She is not having SE with these medications.   She is trying to eat a healthy diet, very limited exercise.     Review of Systems   Constitutional, HEENT, cardiovascular, pulmonary, GI, , musculoskeletal, neuro, skin, endocrine and psych systems are negative, except as otherwise noted.      Objective           Vitals:  No vitals were obtained today due to virtual visit.    Physical Exam   GENERAL: Healthy, alert and no distress  EYES: Eyes grossly normal to inspection.  No discharge or erythema, or obvious scleral/conjunctival abnormalities.  RESP: No audible wheeze, cough, or visible cyanosis.  No visible retractions or increased work of breathing.    SKIN: Visible skin clear. No significant rash, abnormal pigmentation or lesions.  NEURO: Cranial nerves grossly intact.  Mentation and speech appropriate for age.  PSYCH: Mentation appears normal, affect normal/bright, judgement and insight intact, normal speech and appearance well-groomed.          Video-Visit Details    Video Start Time: 130 pm    Type of service:  Video Visit    Video End Time: 140 pm    Originating Location (pt. Location): Home    Distant Location (provider location):  [facility]     Platform used for Video Visit: [employer]  ..

## 2022-10-01 ENCOUNTER — HEALTH MAINTENANCE LETTER (OUTPATIENT)
Age: 42
End: 2022-10-01

## 2022-12-04 ENCOUNTER — MYC MEDICAL ADVICE (OUTPATIENT)
Dept: FAMILY MEDICINE | Facility: CLINIC | Age: 42
End: 2022-12-04

## 2022-12-04 DIAGNOSIS — B00.1 COLD SORE: Primary | ICD-10-CM

## 2022-12-05 RX ORDER — VALACYCLOVIR HYDROCHLORIDE 1 G/1
2000 TABLET, FILM COATED ORAL 2 TIMES DAILY
Qty: 4 TABLET | Refills: 3 | Status: SHIPPED | OUTPATIENT
Start: 2022-12-05 | End: 2022-12-06

## 2023-01-26 ENCOUNTER — VIRTUAL VISIT (OUTPATIENT)
Dept: FAMILY MEDICINE | Facility: CLINIC | Age: 43
End: 2023-01-26
Payer: COMMERCIAL

## 2023-01-26 ENCOUNTER — TELEPHONE (OUTPATIENT)
Dept: FAMILY MEDICINE | Facility: CLINIC | Age: 43
End: 2023-01-26

## 2023-01-26 DIAGNOSIS — E66.09 CLASS 2 OBESITY DUE TO EXCESS CALORIES WITHOUT SERIOUS COMORBIDITY WITH BODY MASS INDEX (BMI) OF 36.0 TO 36.9 IN ADULT: Primary | ICD-10-CM

## 2023-01-26 DIAGNOSIS — E66.812 CLASS 2 OBESITY DUE TO EXCESS CALORIES WITHOUT SERIOUS COMORBIDITY WITH BODY MASS INDEX (BMI) OF 36.0 TO 36.9 IN ADULT: Primary | ICD-10-CM

## 2023-01-26 PROCEDURE — 99213 OFFICE O/P EST LOW 20 MIN: CPT | Mod: 95 | Performed by: PHYSICIAN ASSISTANT

## 2023-01-26 RX ORDER — PHENTERMINE HYDROCHLORIDE 37.5 MG/1
37.5 TABLET ORAL
Qty: 30 TABLET | Refills: 0 | Status: SHIPPED | OUTPATIENT
Start: 2023-01-26

## 2023-01-26 RX ORDER — TOPIRAMATE 50 MG/1
50 TABLET, FILM COATED ORAL DAILY
Qty: 30 TABLET | Refills: 0 | Status: SHIPPED | OUTPATIENT
Start: 2023-01-26

## 2023-01-26 NOTE — PATIENT INSTRUCTIONS
Continue phentermine and topamax for one month  You will need to be seen in person by previous provider or weight management specialty prior to additional refills - information for weight management specialists is below     [facility]   [address] [address]   Phone: [phone]   Fax: [phone]        Comment: Please be aware that coverage of these services is subject to the terms and limitations of your health insurance plan.  Call member services at your health plan with any benefit or coverage questions.   Please call to schedule your appointment

## 2023-01-26 NOTE — PROGRESS NOTES
"Cintia is a 43 year old who is being evaluated via a billable video visit.      How would you like to obtain your AVS? MyChart  If the video visit is dropped, the invitation should be resent by: Text to cell phone: [phone]  Will anyone else be joining your video visit? No          Assessment & Plan     Class 2 obesity due to excess calories without serious comorbidity with body mass index (BMI) of 36.0 to 36.9 in adult  No comorbidity- refilled medications for one month but advised needs to be seen in clinic prior to any additional refills and connect with previous prescriber   - phentermine (ADIPEX-P) 37.5 MG tablet  Dispense: 30 tablet; Refill: 0  - topiramate (TOPAMAX) 50 MG tablet  Dispense: 30 tablet; Refill: 0      Prescription drug management  22 minutes spent on the date of the encounter doing chart review, history and exam, documentation and further activities per the note       BMI:   Estimated body mass index is 36.33 kg/m  as calculated from the following:    Height as of 2/28/22: 1.619 m (5' 3.75\").    Weight as of 2/28/22: 95.3 kg (210 lb).   Weight management plan: Patient referred to endocrine and/or weight management specialty    Patient Instructions     Continue phentermine and topamax for one month  You will need to be seen in person by previous provider or weight management specialty prior to additional refills - information for weight management specialists is below     [facility]   [facility] [address]   Phone: [phone]   Fax: [phone]        Comment: Please be aware that coverage of these services is subject to the terms and limitations of your health insurance plan.  Call member services at your health plan with any benefit or coverage questions.   Please call to schedule your appointment       Follow up in 1 month(s) if not improving or any change in symptoms.      Yasemin Dyson PA-C  [facility] " "CABA  [facility] reviewed and last filled phentermine 8/30/22  Oxycodone 7/28/22      Sanjiv Chamberlain is a 43 year old, presenting for the following health issues:  Medication Refill  refill on phentermine and topiramate     History of Present Illness       Reason for visit:  Medication check/refill    She eats 2-3 servings of fruits and vegetables daily.She consumes 1 sweetened beverage(s) daily.She exercises with enough effort to increase her heart rate 9 or less minutes per day.  She exercises with enough effort to increase her heart rate 3 or less days per week.   She is taking medications regularly.   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   Reports it is Hard to get out of work for an appointment - not seen in clinic in almost a year   Did take blood pressure 15 minutes ago and 124/82 and weight 221 - 5'5\" tall   Weight has been going   Haven't used phentermine for good month or so. Noticing a weight increase  Not reached out to weight loss clinic  Walks 2-3 times a week for at least an hour -owns a treadmill.  Sleep is not good.  Gets 5-6 hours a night   Dr. Bronson is PCP   Dr. Caryl finney phentermine and topamax  Not working with dietician  Was successful with med 2 yrs ago but increased stress and job and real life has caused weight increase   Last virtual visit 5 months advised would need to be seen in clinic which she has not yet done.               Review of Systems   Constitutional, HEENT, cardiovascular, pulmonary, gi and gu systems are negative, except as otherwise noted.      Objective           Vitals:  No vitals were obtained today due to virtual visit.    Physical Exam   GENERAL: alert, no distress and obese  EYES: Eyes grossly normal to inspection.  No discharge or erythema, or obvious " scleral/conjunctival abnormalities.  RESP: No audible wheeze, cough, or visible cyanosis.  No visible retractions or increased work of breathing.    SKIN: Visible skin clear. No significant rash, abnormal pigmentation or lesions.  NEURO: Cranial nerves grossly intact.  Mentation and speech appropriate for age.  PSYCH: Mentation appears normal, affect normal/bright, judgement and insight intact, normal speech and appearance well-groomed.                Video-Visit Details    Type of service:  Video Visit     Originating Location (pt. Location): Other work     Distant Location (provider location):  Off-site  Platform used for Video Visit: [employer]

## 2023-05-14 ENCOUNTER — HEALTH MAINTENANCE LETTER (OUTPATIENT)
Age: 43
End: 2023-05-14

## 2023-06-09 ENCOUNTER — HOSPITAL ENCOUNTER (OUTPATIENT)
Dept: MAMMOGRAPHY | Facility: CLINIC | Age: 43
Discharge: HOME | End: 2023-06-09
Attending: FAMILY MEDICINE | Admitting: FAMILY MEDICINE
Payer: COMMERCIAL

## 2023-06-09 DIAGNOSIS — Z12.31 VISIT FOR SCREENING MAMMOGRAM: ICD-10-CM

## 2023-06-09 PROCEDURE — 77067 SCR MAMMO BI INCL CAD: CPT

## 2024-09-29 ENCOUNTER — HEALTH MAINTENANCE LETTER (OUTPATIENT)
Age: 44
End: 2024-09-29

## 2025-08-10 ENCOUNTER — HEALTH MAINTENANCE LETTER (OUTPATIENT)
Age: 45
End: 2025-08-10